# Patient Record
Sex: MALE | Race: WHITE | HISPANIC OR LATINO | URBAN - METROPOLITAN AREA
[De-identification: names, ages, dates, MRNs, and addresses within clinical notes are randomized per-mention and may not be internally consistent; named-entity substitution may affect disease eponyms.]

---

## 2024-03-06 PROBLEM — Z00.00 ROUTINE GENERAL MEDICAL EXAMINATION AT A HEALTH CARE FACILITY: Status: RESOLVED | Noted: 2024-01-16 | Resolved: 2024-03-06

## 2024-03-06 PROBLEM — E66.813 CLASS 3 SEVERE OBESITY DUE TO EXCESS CALORIES WITHOUT SERIOUS COMORBIDITY WITH BODY MASS INDEX (BMI) OF 40.0 TO 44.9 IN ADULT: Status: ACTIVE | Noted: 2024-03-06

## 2024-03-06 PROBLEM — H61.23 BILATERAL IMPACTED CERUMEN: Status: ACTIVE | Noted: 2024-03-06

## 2024-06-13 ENCOUNTER — LAB (OUTPATIENT)
Dept: LAB | Facility: LAB | Age: 24
End: 2024-06-13
Payer: COMMERCIAL

## 2024-06-13 DIAGNOSIS — R79.89 ABNORMAL LIVER FUNCTION TEST: ICD-10-CM

## 2024-06-13 DIAGNOSIS — E03.8 SUBCLINICAL HYPOTHYROIDISM: ICD-10-CM

## 2024-06-13 LAB
ALBUMIN SERPL BCP-MCNC: 5.2 G/DL (ref 3.4–5)
ALP SERPL-CCNC: 89 U/L (ref 33–120)
ALT SERPL W P-5'-P-CCNC: 56 U/L (ref 10–52)
ANION GAP SERPL CALC-SCNC: 16 MMOL/L (ref 10–20)
AST SERPL W P-5'-P-CCNC: 29 U/L (ref 9–39)
BILIRUB SERPL-MCNC: 0.6 MG/DL (ref 0–1.2)
BUN SERPL-MCNC: 15 MG/DL (ref 6–23)
CALCIUM SERPL-MCNC: 10.4 MG/DL (ref 8.6–10.6)
CHLORIDE SERPL-SCNC: 99 MMOL/L (ref 98–107)
CO2 SERPL-SCNC: 27 MMOL/L (ref 21–32)
CREAT SERPL-MCNC: 0.98 MG/DL (ref 0.5–1.3)
EGFRCR SERPLBLD CKD-EPI 2021: >90 ML/MIN/1.73M*2
GLUCOSE SERPL-MCNC: 90 MG/DL (ref 74–99)
POTASSIUM SERPL-SCNC: 4.8 MMOL/L (ref 3.5–5.3)
PROT SERPL-MCNC: 8 G/DL (ref 6.4–8.2)
SODIUM SERPL-SCNC: 137 MMOL/L (ref 136–145)
TSH SERPL-ACNC: 4.37 MIU/L (ref 0.44–3.98)

## 2024-06-13 PROCEDURE — 36415 COLL VENOUS BLD VENIPUNCTURE: CPT

## 2024-06-13 PROCEDURE — 84443 ASSAY THYROID STIM HORMONE: CPT

## 2024-06-13 PROCEDURE — 80053 COMPREHEN METABOLIC PANEL: CPT

## 2024-07-12 PROCEDURE — RXMED WILLOW AMBULATORY MEDICATION CHARGE

## 2024-07-17 ENCOUNTER — PHARMACY VISIT (OUTPATIENT)
Dept: PHARMACY | Facility: CLINIC | Age: 24
End: 2024-07-17
Payer: COMMERCIAL

## 2024-07-22 ENCOUNTER — APPOINTMENT (OUTPATIENT)
Dept: PRIMARY CARE | Facility: CLINIC | Age: 24
End: 2024-07-22
Payer: COMMERCIAL

## 2024-08-02 ENCOUNTER — LAB (OUTPATIENT)
Dept: LAB | Facility: LAB | Age: 24
End: 2024-08-02
Payer: COMMERCIAL

## 2024-08-02 DIAGNOSIS — E03.8 OTHER SPECIFIED HYPOTHYROIDISM: ICD-10-CM

## 2024-08-02 LAB
T3 SERPL-MCNC: 103 NG/DL (ref 60–200)
T4 FREE SERPL-MCNC: 1.25 NG/DL (ref 0.78–1.48)
TSH SERPL-ACNC: 3.44 MIU/L (ref 0.44–3.98)

## 2024-08-13 PROCEDURE — RXMED WILLOW AMBULATORY MEDICATION CHARGE

## 2024-08-19 ENCOUNTER — PHARMACY VISIT (OUTPATIENT)
Dept: PHARMACY | Facility: CLINIC | Age: 24
End: 2024-08-19
Payer: COMMERCIAL

## 2024-08-19 PROCEDURE — RXMED WILLOW AMBULATORY MEDICATION CHARGE

## 2024-09-10 PROCEDURE — RXMED WILLOW AMBULATORY MEDICATION CHARGE

## 2024-09-12 ENCOUNTER — PHARMACY VISIT (OUTPATIENT)
Dept: PHARMACY | Facility: CLINIC | Age: 24
End: 2024-09-12
Payer: COMMERCIAL

## 2024-09-12 ENCOUNTER — APPOINTMENT (OUTPATIENT)
Dept: PRIMARY CARE | Facility: CLINIC | Age: 24
End: 2024-09-12
Payer: COMMERCIAL

## 2024-11-14 PROCEDURE — RXMED WILLOW AMBULATORY MEDICATION CHARGE

## 2024-11-20 ENCOUNTER — PHARMACY VISIT (OUTPATIENT)
Dept: PHARMACY | Facility: CLINIC | Age: 24
End: 2024-11-20
Payer: COMMERCIAL

## 2024-11-25 ENCOUNTER — APPOINTMENT (OUTPATIENT)
Dept: PRIMARY CARE | Facility: CLINIC | Age: 24
End: 2024-11-25
Payer: COMMERCIAL

## 2024-12-12 PROCEDURE — RXMED WILLOW AMBULATORY MEDICATION CHARGE

## 2024-12-18 ENCOUNTER — PHARMACY VISIT (OUTPATIENT)
Dept: PHARMACY | Facility: CLINIC | Age: 24
End: 2024-12-18
Payer: COMMERCIAL

## 2025-01-20 ENCOUNTER — OFFICE VISIT (OUTPATIENT)
Dept: URGENT CARE | Age: 25
End: 2025-01-20
Payer: COMMERCIAL

## 2025-01-20 VITALS
RESPIRATION RATE: 20 BRPM | SYSTOLIC BLOOD PRESSURE: 118 MMHG | TEMPERATURE: 97.8 F | HEIGHT: 72 IN | WEIGHT: 294 LBS | BODY MASS INDEX: 39.82 KG/M2 | OXYGEN SATURATION: 96 % | DIASTOLIC BLOOD PRESSURE: 78 MMHG | HEART RATE: 112 BPM

## 2025-01-20 DIAGNOSIS — R11.0 NAUSEA: ICD-10-CM

## 2025-01-20 DIAGNOSIS — R73.9 HYPERGLYCEMIA: ICD-10-CM

## 2025-01-20 DIAGNOSIS — R55 PRE-SYNCOPE: Primary | ICD-10-CM

## 2025-01-20 LAB — POC FINGERSTICK BLOOD GLUCOSE: 144 MG/DL (ref 70–100)

## 2025-01-20 RX ORDER — ONDANSETRON 4 MG/1
4 TABLET, ORALLY DISINTEGRATING ORAL ONCE
Status: COMPLETED | OUTPATIENT
Start: 2025-01-20 | End: 2025-01-20

## 2025-01-20 RX ADMIN — ONDANSETRON 4 MG: 4 TABLET, ORALLY DISINTEGRATING ORAL at 19:34

## 2025-01-20 RX ADMIN — ONDANSETRON 4 MG: 4 TABLET, ORALLY DISINTEGRATING ORAL at 19:36

## 2025-01-20 ASSESSMENT — ENCOUNTER SYMPTOMS
LIGHT-HEADEDNESS: 1
PSYCHIATRIC NEGATIVE: 1
CONSTITUTIONAL NEGATIVE: 1

## 2025-01-20 NOTE — PATIENT INSTRUCTIONS
Drink plenty of water    If your symptoms got worse or if you developed syncope seek help immediately  Follow up with PCP in a week

## 2025-01-20 NOTE — PROGRESS NOTES
Subjective   Patient ID: Bradley Mcguire is a 24 y.o. male. They present today with a chief complaint of Other.    History of Present Illness  HPI    Past Medical History  Allergies as of 01/20/2025    (No Known Allergies)       (Not in a hospital admission)       History reviewed. No pertinent past medical history.    History reviewed. No pertinent surgical history.     reports that he has never smoked. He has never used smokeless tobacco. He reports current alcohol use. He reports that he does not use drugs.    Review of Systems  Review of Systems   Constitutional: Negative.    HENT:  Positive for congestion.    Cardiovascular:  Positive for chest pain.   Neurological:  Positive for syncope and light-headedness.   Psychiatric/Behavioral: Negative.                                    Objective    Vitals:    01/20/25 1658   BP: 118/78   Pulse: (!) 112   Resp: 20   Temp: 36.6 °C (97.8 °F)   TempSrc: Oral   SpO2: 96%   Weight: 133 kg (294 lb)   Height: 1.829 m (6')     No LMP for male patient.    Physical Exam  Constitutional:       Appearance: Normal appearance.   HENT:      Right Ear: Tympanic membrane normal.      Left Ear: Tympanic membrane normal.      Nose: Nose normal.   Eyes:      Extraocular Movements: Extraocular movements intact.      Conjunctiva/sclera: Conjunctivae normal.      Pupils: Pupils are equal, round, and reactive to light.   Cardiovascular:      Rate and Rhythm: Tachycardia present.   Pulmonary:      Effort: Pulmonary effort is normal.      Breath sounds: Normal breath sounds.   Neurological:      General: No focal deficit present.      Mental Status: He is alert and oriented to person, place, and time.         Procedures    Point of Care Test & Imaging Results from this visit  No results found for this visit on 01/20/25.   No results found.    Diagnostic study results (if any) were reviewed by Caterina Mendes MD.    Assessment/Plan   Allergies, medications,  history, and pertinent labs/EKGs/Imaging reviewed by Caterina Mendes MD.     Medical Decision Making      Orders and Diagnoses  There are no diagnoses linked to this encounter.    Medical Admin Record      Patient disposition: Home    Electronically signed by Caterina Mendes MD  5:57 PM

## 2025-01-24 ENCOUNTER — PHARMACY VISIT (OUTPATIENT)
Dept: PHARMACY | Facility: CLINIC | Age: 25
End: 2025-01-24
Payer: COMMERCIAL

## 2025-01-24 PROCEDURE — RXMED WILLOW AMBULATORY MEDICATION CHARGE

## 2025-03-19 ENCOUNTER — PHARMACY VISIT (OUTPATIENT)
Dept: PHARMACY | Facility: CLINIC | Age: 25
End: 2025-03-19
Payer: COMMERCIAL

## 2025-03-19 PROCEDURE — RXMED WILLOW AMBULATORY MEDICATION CHARGE

## 2025-04-22 ENCOUNTER — OFFICE VISIT (OUTPATIENT)
Facility: HOSPITAL | Age: 25
End: 2025-04-22
Payer: COMMERCIAL

## 2025-04-22 VITALS
OXYGEN SATURATION: 92 % | DIASTOLIC BLOOD PRESSURE: 77 MMHG | HEART RATE: 132 BPM | BODY MASS INDEX: 41.07 KG/M2 | HEIGHT: 72 IN | WEIGHT: 303.2 LBS | SYSTOLIC BLOOD PRESSURE: 138 MMHG | TEMPERATURE: 97.4 F

## 2025-04-22 DIAGNOSIS — E66.813 CLASS 3 SEVERE OBESITY DUE TO EXCESS CALORIES WITHOUT SERIOUS COMORBIDITY WITH BODY MASS INDEX (BMI) OF 40.0 TO 44.9 IN ADULT: ICD-10-CM

## 2025-04-22 DIAGNOSIS — E03.8 SUBCLINICAL HYPOTHYROIDISM: ICD-10-CM

## 2025-04-22 DIAGNOSIS — E66.01 CLASS 3 SEVERE OBESITY DUE TO EXCESS CALORIES WITHOUT SERIOUS COMORBIDITY WITH BODY MASS INDEX (BMI) OF 40.0 TO 44.9 IN ADULT: ICD-10-CM

## 2025-04-22 DIAGNOSIS — F33.1 MODERATE EPISODE OF RECURRENT MAJOR DEPRESSIVE DISORDER: Primary | ICD-10-CM

## 2025-04-22 PROCEDURE — RXMED WILLOW AMBULATORY MEDICATION CHARGE

## 2025-04-22 RX ORDER — BUPROPION HYDROCHLORIDE 150 MG/1
150 TABLET ORAL EVERY MORNING
Qty: 30 TABLET | Refills: 1 | Status: SHIPPED | OUTPATIENT
Start: 2025-04-22 | End: 2025-06-21

## 2025-04-22 ASSESSMENT — ENCOUNTER SYMPTOMS
DEPRESSION: 0
LOSS OF SENSATION IN FEET: 0
OCCASIONAL FEELINGS OF UNSTEADINESS: 0

## 2025-04-22 ASSESSMENT — COLUMBIA-SUICIDE SEVERITY RATING SCALE - C-SSRS
2. HAVE YOU ACTUALLY HAD ANY THOUGHTS OF KILLING YOURSELF?: NO
1. IN THE PAST MONTH, HAVE YOU WISHED YOU WERE DEAD OR WISHED YOU COULD GO TO SLEEP AND NOT WAKE UP?: NO
6. HAVE YOU EVER DONE ANYTHING, STARTED TO DO ANYTHING, OR PREPARED TO DO ANYTHING TO END YOUR LIFE?: NO

## 2025-04-22 ASSESSMENT — PATIENT HEALTH QUESTIONNAIRE - PHQ9
2. FEELING DOWN, DEPRESSED OR HOPELESS: NOT AT ALL
SUM OF ALL RESPONSES TO PHQ9 QUESTIONS 1 AND 2: 0
1. LITTLE INTEREST OR PLEASURE IN DOING THINGS: NOT AT ALL

## 2025-04-22 ASSESSMENT — PAIN SCALES - GENERAL: PAINLEVEL_OUTOF10: 0-NO PAIN

## 2025-04-22 NOTE — PROGRESS NOTES
"Subjective   Patient ID: Bradley Mcguire is a 25 y.o. male who presents for Follow-up.    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     Objective   Vitals: /77 (BP Location: Right arm, Patient Position: Sitting, BP Cuff Size: Large adult)   Pulse (!) 132   Temp 36.3 °C (97.4 °F) (Temporal)   Ht 1.83 m (6' 0.05\")   Wt 138 kg (303 lb 3.2 oz)   SpO2 92%   BMI 41.07 kg/m²      Physical Exam  General:  Well developed. Alert. No acute distress.  Skin:  Warm, dry. normal skin turgor. no rashes. no lesions.   Head: NCAT  EENT: MMM  Cardiovascular:  Regular rate and rhythm, normal S1 and S2, no gallops, no murmurs and no pericardial rub.  Pulmonary:  CTAB in all fields  Abdomen:  (+) BS. soft. non-tender. non-distended. no abdominal masses. no guarding or rigidity.  Neurologic:  grossly intact  Musculoskeletal: moves all extremities spontaneously  Psychiatric:  appropriate mood and affect    Assessment/Plan   26 yo M presenting for f/up.     Moderate episode of recurrent major depressive disorder  - Discussed options for treatment, and landed on bupropion given its activating effects " From: Thomas Bauer  To: Pritesh Mike PA-C  Sent: 7/11/2019 9:49 AM CDT  Subject: Prescription Question    I have an interview tomorrow and I’m out of Xanax. I requested a refill and have not heard back. I have an appointment on 7/17.     Rayo Rooney and potential for weight loss  -     buPROPion XL (Wellbutrin XL) 150 mg 24 hr tablet; Take 1 tablet (150 mg) by mouth once daily in the morning. Do not crush, chew, or split.    Subclinical hypothyroidism  -     TSH; Future  -     T4, free; Future  -     T3, free; Future    Class 3 severe obesity due to excess calories without serious comorbidity with body mass index (BMI) of 40.0 to 44.9 in adult  -     Hemoglobin A1c; Future      RTC in 6 weeks, or earlier as needed.  Attending Supervision: discussed with attending physician Dr. Melanie Acuna.  Saqib Moe MD  Family Medicine Resident.           [1]   Patient Active Problem List  Diagnosis    Other specified hypothyroidism    Binge eating    Pre-diabetes    Class 3 severe obesity due to excess calories without serious comorbidity with body mass index (BMI) of 40.0 to 44.9 in adult    Bilateral impacted cerumen    Dyslipidemia    LUIS ALBERTO (generalized anxiety disorder)    Moderate episode of recurrent major depressive disorder   [2] No past surgical history on file.  [3] No Known Allergies  [4]   Social History  Tobacco Use    Smoking status: Never    Smokeless tobacco: Never   Substance Use Topics    Alcohol use: Yes    Drug use: Never   [5] No family history on file.

## 2025-04-27 DIAGNOSIS — R73.03 PREDIABETES: ICD-10-CM

## 2025-04-27 DIAGNOSIS — E03.8 SUBCLINICAL HYPOTHYROIDISM: ICD-10-CM

## 2025-04-27 DIAGNOSIS — E78.5 DYSLIPIDEMIA: ICD-10-CM

## 2025-04-29 ENCOUNTER — PHARMACY VISIT (OUTPATIENT)
Dept: PHARMACY | Facility: CLINIC | Age: 25
End: 2025-04-29
Payer: COMMERCIAL

## 2025-04-29 PROCEDURE — RXMED WILLOW AMBULATORY MEDICATION CHARGE

## 2025-04-29 RX ORDER — LEVOTHYROXINE SODIUM 75 UG/1
75 TABLET ORAL DAILY
Qty: 30 TABLET | Refills: 11 | Status: SHIPPED | OUTPATIENT
Start: 2025-04-29 | End: 2026-04-29

## 2025-04-29 RX ORDER — METFORMIN HYDROCHLORIDE 500 MG/1
500 TABLET, EXTENDED RELEASE ORAL
Qty: 90 TABLET | Refills: 3 | Status: SHIPPED | OUTPATIENT
Start: 2025-04-29 | End: 2026-04-29

## 2025-04-29 RX ORDER — SIMVASTATIN 20 MG/1
20 TABLET, FILM COATED ORAL DAILY
Qty: 90 TABLET | Refills: 3 | Status: SHIPPED | OUTPATIENT
Start: 2025-04-29 | End: 2026-04-24

## 2025-05-06 ENCOUNTER — PHARMACY VISIT (OUTPATIENT)
Dept: PHARMACY | Facility: CLINIC | Age: 25
End: 2025-05-06
Payer: COMMERCIAL

## 2025-05-22 LAB
EST. AVERAGE GLUCOSE BLD GHB EST-MCNC: 123 MG/DL
EST. AVERAGE GLUCOSE BLD GHB EST-SCNC: 6.8 MMOL/L
HBA1C MFR BLD: 5.9 %
T3FREE SERPL-MCNC: 3.4 PG/ML (ref 2.3–4.2)
T4 FREE SERPL-MCNC: 1.4 NG/DL (ref 0.8–1.8)
TSH SERPL-ACNC: 1.85 MIU/L (ref 0.4–4.5)

## 2025-05-23 ENCOUNTER — APPOINTMENT (OUTPATIENT)
Facility: HOSPITAL | Age: 25
End: 2025-05-23
Payer: COMMERCIAL

## 2025-05-23 VITALS
HEIGHT: 72 IN | HEART RATE: 105 BPM | BODY MASS INDEX: 41.17 KG/M2 | OXYGEN SATURATION: 97 % | TEMPERATURE: 97.1 F | WEIGHT: 304 LBS | SYSTOLIC BLOOD PRESSURE: 128 MMHG | DIASTOLIC BLOOD PRESSURE: 84 MMHG

## 2025-05-23 DIAGNOSIS — R73.03 PREDIABETES: ICD-10-CM

## 2025-05-23 DIAGNOSIS — E03.8 SUBCLINICAL HYPOTHYROIDISM: ICD-10-CM

## 2025-05-23 DIAGNOSIS — F33.1 MODERATE EPISODE OF RECURRENT MAJOR DEPRESSIVE DISORDER: Primary | ICD-10-CM

## 2025-05-23 PROCEDURE — RXMED WILLOW AMBULATORY MEDICATION CHARGE

## 2025-05-23 RX ORDER — METFORMIN HYDROCHLORIDE 500 MG/1
1000 TABLET, EXTENDED RELEASE ORAL
Qty: 180 TABLET | Refills: 3 | Status: SHIPPED | OUTPATIENT
Start: 2025-05-23 | End: 2026-05-23

## 2025-05-23 RX ORDER — METFORMIN HYDROCHLORIDE 1000 MG/1
1000 TABLET, EXTENDED RELEASE ORAL
Qty: 90 TABLET | Refills: 3 | Status: SHIPPED | OUTPATIENT
Start: 2025-05-23 | End: 2025-05-23 | Stop reason: SDUPTHER

## 2025-05-23 RX ORDER — BUPROPION HYDROCHLORIDE 75 MG/1
75 TABLET ORAL 2 TIMES DAILY
Qty: 60 TABLET | Refills: 3 | Status: SHIPPED | OUTPATIENT
Start: 2025-05-23 | End: 2025-07-22

## 2025-05-23 RX ORDER — LEVOTHYROXINE SODIUM 75 UG/1
75 TABLET ORAL DAILY
Qty: 90 TABLET | Refills: 1 | Status: SHIPPED | OUTPATIENT
Start: 2025-05-23 | End: 2026-05-23

## 2025-05-23 ASSESSMENT — PAIN SCALES - GENERAL: PAINLEVEL_OUTOF10: 0-NO PAIN

## 2025-05-23 NOTE — PROGRESS NOTES
Subjective   Patient ID: Bradley Mcguire is a 25 y.o. male who presents for Follow-up and Med Refill.    HPI  - did not tolerate wellbutrin  - had pre-syncopal symptoms while tapering escitalopram   - felt like he was spinning even when laying down, but did not pass out  - did not re-try wellbutrin for fear of continued symptoms  - denied chest pain, palpitations   - symptoms resolved spontaneously, and he continued to taper escitalopram and doing well without either medication currently  - willing to re-start wellbutrin at a lower dose, but not escitalopram due to sexual side effects    Problem List[1]   Current Outpatient Medications   Medication Instructions    buPROPion XL (WELLBUTRIN XL) 150 mg, oral, Every morning, Do not crush, chew, or split.    levothyroxine (SYNTHROID, LEVOXYL) 75 mcg, oral, Daily    metFORMIN XR (GLUCOPHAGE-XR) 500 mg, oral, Daily with evening meal    simvastatin (ZOCOR) 20 mg, oral, Daily      Surgical History[2]   Allergies[3]   Social History[4]   Family History[5]     Review of Systems  Ten point review of systems negative unless specified in HPI.     Objective   Vitals: /84 (BP Location: Right arm, Patient Position: Sitting, BP Cuff Size: Large adult)   Pulse 105   Temp 36.2 °C (97.1 °F) (Temporal)   Ht 1.829 m (6')   Wt 138 kg (304 lb)   SpO2 97%   BMI 41.23 kg/m²      Physical Exam  General:  Well developed. Alert. No acute distress.  Skin:  Warm, dry. normal skin turgor. no rashes. no lesions.   Head: NCAT  EENT: MMM  Cardiovascular:  Regular rate and rhythm, normal S1 and S2, no gallops, no murmurs and no pericardial rub.  Pulmonary:  CTAB in all fields  Abdomen:  (+) BS. soft. non-tender. non-distended. no abdominal masses. no guarding or rigidity.  Neurologic:  grossly intact  Musculoskeletal: moves all extremities spontaneously  Psychiatric:  appropriate mood and affect    Assessment/Plan   24 yo M presenting for follow-up.      Moderate episode of recurrent major depressive disorder  -     buPROPion (Wellbutrin) 75 mg tablet; Take 1 tablet (75 mg) by mouth 2 times a day.   - start at once daily and increase after 3 days as tolerated; discussed increasing to 150 mg BID after 2-4 weeks as tolerated  -     Referral to Psychology; Future    Prediabetes  -     metFORMIN XR (Glucophage-XR) 500 mg 24 hr tablet; Take 2 tablets (1,000 mg) by mouth once daily in the evening. Take with meals.    Subclinical hypothyroidism  -     levothyroxine (Synthroid, Levoxyl) 75 mcg tablet; Take 1 tablet (75 mcg) by mouth once daily.      RTC in 3-6 mos, or earlier as needed.  Attending Supervision: discussed with attending physician Dr. Alena Rahman.  Saqib Moe MD  Family Medicine Resident.           [1]   Patient Active Problem List  Diagnosis    Other specified hypothyroidism    Binge eating    Pre-diabetes    Class 3 severe obesity due to excess calories without serious comorbidity with body mass index (BMI) of 40.0 to 44.9 in adult    Bilateral impacted cerumen    Dyslipidemia    LUIS ALBERTO (generalized anxiety disorder)    Moderate episode of recurrent major depressive disorder   [2] No past surgical history on file.  [3] No Known Allergies  [4]   Social History  Tobacco Use    Smoking status: Never    Smokeless tobacco: Never   Substance Use Topics    Alcohol use: Yes    Drug use: Never   [5] No family history on file.

## 2025-05-27 ENCOUNTER — PHARMACY VISIT (OUTPATIENT)
Dept: PHARMACY | Facility: CLINIC | Age: 25
End: 2025-05-27
Payer: COMMERCIAL

## 2025-06-02 ENCOUNTER — OFFICE VISIT (OUTPATIENT)
Facility: HOSPITAL | Age: 25
End: 2025-06-02
Payer: COMMERCIAL

## 2025-06-02 VITALS
SYSTOLIC BLOOD PRESSURE: 140 MMHG | HEIGHT: 72 IN | DIASTOLIC BLOOD PRESSURE: 88 MMHG | HEART RATE: 72 BPM | TEMPERATURE: 96 F | OXYGEN SATURATION: 96 % | WEIGHT: 297 LBS | BODY MASS INDEX: 40.23 KG/M2

## 2025-06-02 DIAGNOSIS — R73.03 PREDIABETES: ICD-10-CM

## 2025-06-02 DIAGNOSIS — E66.813 CLASS 3 SEVERE OBESITY DUE TO EXCESS CALORIES WITHOUT SERIOUS COMORBIDITY WITH BODY MASS INDEX (BMI) OF 40.0 TO 44.9 IN ADULT: Primary | ICD-10-CM

## 2025-06-02 DIAGNOSIS — G47.33 OSA (OBSTRUCTIVE SLEEP APNEA): ICD-10-CM

## 2025-06-02 PROCEDURE — 1036F TOBACCO NON-USER: CPT | Performed by: PODIATRIST

## 2025-06-02 PROCEDURE — 3008F BODY MASS INDEX DOCD: CPT | Performed by: PODIATRIST

## 2025-06-02 PROCEDURE — 99213 OFFICE O/P EST LOW 20 MIN: CPT | Performed by: PODIATRIST

## 2025-06-02 PROCEDURE — 99213 OFFICE O/P EST LOW 20 MIN: CPT | Mod: GE | Performed by: PODIATRIST

## 2025-06-02 RX ORDER — TIRZEPATIDE 2.5 MG/.5ML
2.5 INJECTION, SOLUTION SUBCUTANEOUS
Qty: 2 ML | Refills: 0 | Status: SHIPPED | OUTPATIENT
Start: 2025-06-02 | End: 2025-07-02

## 2025-06-02 RX ORDER — TIRZEPATIDE 5 MG/.5ML
5 INJECTION, SOLUTION SUBCUTANEOUS
Qty: 2 ML | Refills: 2 | Status: SHIPPED | OUTPATIENT
Start: 2025-07-02 | End: 2025-09-30

## 2025-06-02 ASSESSMENT — ENCOUNTER SYMPTOMS
AGITATION: 0
ADENOPATHY: 0
SINUS PRESSURE: 0
NUMBNESS: 0
ABDOMINAL DISTENTION: 0
VOICE CHANGE: 0
FLANK PAIN: 0
EYE ITCHING: 0
HEADACHES: 0
BACK PAIN: 0
ABDOMINAL PAIN: 0
ARTHRALGIAS: 0
SORE THROAT: 0
DIZZINESS: 0
DIFFICULTY URINATING: 0
EYE REDNESS: 0
CHEST TIGHTNESS: 0
DYSURIA: 0
FEVER: 0
SHORTNESS OF BREATH: 0
CHILLS: 0
FATIGUE: 0
RHINORRHEA: 0
DIAPHORESIS: 0
COUGH: 0
HEMATURIA: 0

## 2025-06-02 ASSESSMENT — PAIN SCALES - GENERAL: PAINLEVEL_OUTOF10: 0-NO PAIN

## 2025-06-02 NOTE — PATIENT INSTRUCTIONS
Thank you for coming in to see us today, Mr. Bradley Whitten Kalee Mcguire! It was a pleasure managing your health together.    Today in clinic, we discussed weight loss, prediabetes, snoring.    If we ordered bloodwork today, you can get this done at ANY  location and the results will come to me directly. The [facility] here at [facility] are walk-in (no appointment needed); [facility]'s hours are M-F 6:30a-6p and Sat 8a-12p.    Please call [phone] to schedule your appointment.    If you have any questions/concerns, you can always use [employer] to message me directly. Or if you prefer, you can call the office at [phone]; if you leave a message, the office staff should translate this to a message in my inbox.    I'm looking forward to seeing you back in clinic in 3 months to discuss follow up.    Best,  Dr. Mcintyre

## 2025-06-02 NOTE — PROGRESS NOTES
Subjective   Patient ID: Bradley Mgcuire is a 25 y.o. male with PMH subclinical hypothyroidism, prediabetes, Anxiety who presents for Follow-up (Weight loss and other issues. ) and presents with his wife.     HPI     #Weight loss   He has been looking at options to lose weight. He would like referral to [facility]. He would like to be on a GLP-1. He takes 1000 mg metformin before night and tolerates it well with no side effects. He is more interested in starting GLP-1 instead of increasing metformin. Last A1c 5.9% 5/21/25. Uses a kettle bell, plays some pickle ball for exercise. Does not enjoy going to the gym. He walks the dog with his wife.     #Snoring  He stops breathing at night according to his wife. It has been going on for 4 months. STOP-BANG 4.    #Social  -Is a radiology resident here at . Presents with wife who is also in medical field wanting to pursue psychiatry.     #Anxiety  Has not seen psychology yet. Currently on bupropion.       Review of Systems   Constitutional:  Negative for chills, diaphoresis, fatigue and fever.   HENT:  Negative for congestion, rhinorrhea, sinus pressure, sore throat and voice change.    Eyes:  Negative for redness and itching.   Respiratory:  Negative for cough, chest tightness and shortness of breath.    Cardiovascular:  Negative for chest pain.   Gastrointestinal:  Negative for abdominal distention and abdominal pain.   Endocrine: Negative for cold intolerance and heat intolerance.   Genitourinary:  Negative for difficulty urinating, dysuria, flank pain and hematuria.   Musculoskeletal:  Negative for arthralgias and back pain.   Neurological:  Negative for dizziness, numbness and headaches.   Hematological:  Negative for adenopathy.   Psychiatric/Behavioral:  Negative for agitation and behavioral problems.        Objective   /88 (BP Location: Right arm, Patient Position: Sitting)   Pulse 72   Temp 35.6 °C (96 °F) (Temporal)   Ht  1.829 m (6')   Wt 135 kg (297 lb)   SpO2 96%   BMI 40.28 kg/m²     Physical Exam  Vitals reviewed.   Constitutional:       General: He is not in acute distress.     Appearance: Normal appearance.   HENT:      Head: Normocephalic and atraumatic.      Nose: Nose normal.      Mouth/Throat:      Mouth: Mucous membranes are moist.   Eyes:      Conjunctiva/sclera: Conjunctivae normal.   Cardiovascular:      Rate and Rhythm: Normal rate and regular rhythm.   Pulmonary:      Effort: Pulmonary effort is normal. No respiratory distress.      Breath sounds: Normal breath sounds. No wheezing.   Abdominal:      General: Bowel sounds are normal.      Palpations: Abdomen is soft.      Tenderness: There is no abdominal tenderness.   Musculoskeletal:         General: No swelling. Normal range of motion.      Cervical back: Normal range of motion and neck supple.   Skin:     General: Skin is warm.   Neurological:      General: No focal deficit present.      Mental Status: He is alert.         Assessment/Plan     Bradley Whitten Kalee Mcguire is a 25 y.o. male with PMH subclinical hypothyroidism, prediabetes, Anxiety who presents for Follow-up (Weight loss and other issues. ).    Diagnoses and all orders for this visit:  Class 3 severe obesity due to excess calories without serious comorbidity with body mass index (BMI) of 40.0 to 44.9 in adult  Prediabetes        -     Last A1c 5.9% on 5/21/25. In prediabetes range. Is currently on metformin 1000 mg at night. Tolerating it well with no side effects. Declined increasing dose of metformin. Would like to start on GLP1 agonist. Discussed side effects. Will start on Zepbound 2.5 mg weekly. Discussed potential side effects with patient. Discussed importance of exercise while on this medication. Referral to [facility] and clinical pharmacy to cover potential prior auth for Zepbound.   -     Referral to [facility]; Future  -     Referral to Clinical Pharmacy; Future  -      tirzepatide, weight loss, (Zepbound) 2.5 mg/0.5 mL injection; Inject 2.5 mg under the skin every 7 days.  -     tirzepatide, weight loss, (Zepbound) 5 mg/0.5 mL injection; Inject 5 mg under the skin every 7 days. Do not fill before July 2, 2025.    ROSSY (obstructive sleep apnea)  -     Patient has a STOP-BANG of 4. Will refer to sleep medicine for further work up.   -     Referral to Adult Sleep Medicine; Future  Return to clinic        -     In 3 months  for follow up of Zepbound and how tolerating, Anxiety and ROSSY    Patient discussed with attending physician, Dr. Magdaleno.     Jyothi Mcintyre MD   PGY2, Family Medicine

## 2025-06-02 NOTE — PROGRESS NOTES
I reviewed the resident/fellow's documentation and discussed the patient with the resident/fellow. I agree with the resident/fellow's medical decision making as documented in the note.    Brady Magdaleno MD

## 2025-06-19 ENCOUNTER — APPOINTMENT (OUTPATIENT)
Facility: HOSPITAL | Age: 25
End: 2025-06-19
Payer: COMMERCIAL

## 2025-06-25 ENCOUNTER — CLINICAL SUPPORT (OUTPATIENT)
Dept: EMERGENCY MEDICINE | Facility: HOSPITAL | Age: 25
End: 2025-06-25
Payer: COMMERCIAL

## 2025-06-25 ENCOUNTER — HOSPITAL ENCOUNTER (OUTPATIENT)
Facility: HOSPITAL | Age: 25
Setting detail: OBSERVATION
Discharge: PSYCHIATRIC OR SUBSTANCE USE FACILITY | End: 2025-06-27
Attending: STUDENT IN AN ORGANIZED HEALTH CARE EDUCATION/TRAINING PROGRAM | Admitting: EMERGENCY MEDICINE
Payer: COMMERCIAL

## 2025-06-25 DIAGNOSIS — R45.851 SUICIDAL IDEATION: Primary | ICD-10-CM

## 2025-06-25 LAB
ALBUMIN SERPL BCP-MCNC: 5.1 G/DL (ref 3.4–5)
ALP SERPL-CCNC: 83 U/L (ref 33–120)
ALT SERPL W P-5'-P-CCNC: 53 U/L (ref 10–52)
AMPHETAMINES UR QL SCN: NORMAL
ANION GAP SERPL CALC-SCNC: 15 MMOL/L (ref 10–20)
APAP SERPL-MCNC: <10 UG/ML (ref ?–30)
APPEARANCE UR: CLEAR
AST SERPL W P-5'-P-CCNC: 25 U/L (ref 9–39)
BARBITURATES UR QL SCN: NORMAL
BASOPHILS # BLD AUTO: 0.04 X10*3/UL (ref 0–0.1)
BASOPHILS NFR BLD AUTO: 0.5 %
BENZODIAZ UR QL SCN: NORMAL
BILIRUB SERPL-MCNC: 0.5 MG/DL (ref 0–1.2)
BILIRUB UR STRIP.AUTO-MCNC: NEGATIVE MG/DL
BUN SERPL-MCNC: 13 MG/DL (ref 6–23)
BZE UR QL SCN: NORMAL
CALCIUM SERPL-MCNC: 10.1 MG/DL (ref 8.6–10.6)
CANNABINOIDS UR QL SCN: NORMAL
CHLORIDE SERPL-SCNC: 103 MMOL/L (ref 98–107)
CO2 SERPL-SCNC: 22 MMOL/L (ref 21–32)
COLOR UR: ABNORMAL
CREAT SERPL-MCNC: 0.9 MG/DL (ref 0.5–1.3)
EGFRCR SERPLBLD CKD-EPI 2021: >90 ML/MIN/1.73M*2
EOSINOPHIL # BLD AUTO: 0.11 X10*3/UL (ref 0–0.7)
EOSINOPHIL NFR BLD AUTO: 1.3 %
ERYTHROCYTE [DISTWIDTH] IN BLOOD BY AUTOMATED COUNT: 11.9 % (ref 11.5–14.5)
ETHANOL SERPL-MCNC: <10 MG/DL
FENTANYL+NORFENTANYL UR QL SCN: NORMAL
GLUCOSE SERPL-MCNC: 107 MG/DL (ref 74–99)
GLUCOSE UR STRIP.AUTO-MCNC: NORMAL MG/DL
HCT VFR BLD AUTO: 44.9 % (ref 41–52)
HGB BLD-MCNC: 15.7 G/DL (ref 13.5–17.5)
IMM GRANULOCYTES # BLD AUTO: 0.02 X10*3/UL (ref 0–0.7)
IMM GRANULOCYTES NFR BLD AUTO: 0.2 % (ref 0–0.9)
KETONES UR STRIP.AUTO-MCNC: NEGATIVE MG/DL
LEUKOCYTE ESTERASE UR QL STRIP.AUTO: NEGATIVE
LYMPHOCYTES # BLD AUTO: 1.74 X10*3/UL (ref 1.2–4.8)
LYMPHOCYTES NFR BLD AUTO: 20.6 %
MCH RBC QN AUTO: 28.6 PG (ref 26–34)
MCHC RBC AUTO-ENTMCNC: 35 G/DL (ref 32–36)
MCV RBC AUTO: 82 FL (ref 80–100)
METHADONE UR QL SCN: NORMAL
MONOCYTES # BLD AUTO: 0.66 X10*3/UL (ref 0.1–1)
MONOCYTES NFR BLD AUTO: 7.8 %
MUCOUS THREADS #/AREA URNS AUTO: NORMAL /LPF
NEUTROPHILS # BLD AUTO: 5.86 X10*3/UL (ref 1.2–7.7)
NEUTROPHILS NFR BLD AUTO: 69.6 %
NITRITE UR QL STRIP.AUTO: NEGATIVE
NRBC BLD-RTO: 0 /100 WBCS (ref 0–0)
OPIATES UR QL SCN: NORMAL
OXYCODONE+OXYMORPHONE UR QL SCN: NORMAL
PCP UR QL SCN: NORMAL
PH UR STRIP.AUTO: 6 [PH]
PLATELET # BLD AUTO: 337 X10*3/UL (ref 150–450)
POTASSIUM SERPL-SCNC: 4 MMOL/L (ref 3.5–5.3)
PROT SERPL-MCNC: 8.5 G/DL (ref 6.4–8.2)
PROT UR STRIP.AUTO-MCNC: NEGATIVE MG/DL
RBC # BLD AUTO: 5.49 X10*6/UL (ref 4.5–5.9)
RBC # UR STRIP.AUTO: ABNORMAL MG/DL
RBC #/AREA URNS AUTO: NORMAL /HPF
SALICYLATES SERPL-MCNC: <3 MG/DL (ref ?–20)
SODIUM SERPL-SCNC: 136 MMOL/L (ref 136–145)
SP GR UR STRIP.AUTO: 1.03
SQUAMOUS #/AREA URNS AUTO: NORMAL /HPF
TSH SERPL-ACNC: 3.95 MIU/L (ref 0.44–3.98)
UROBILINOGEN UR STRIP.AUTO-MCNC: NORMAL MG/DL
WBC # BLD AUTO: 8.4 X10*3/UL (ref 4.4–11.3)
WBC #/AREA URNS AUTO: NORMAL /HPF

## 2025-06-25 PROCEDURE — 80307 DRUG TEST PRSMV CHEM ANLYZR: CPT | Performed by: STUDENT IN AN ORGANIZED HEALTH CARE EDUCATION/TRAINING PROGRAM

## 2025-06-25 PROCEDURE — 99285 EMERGENCY DEPT VISIT HI MDM: CPT | Performed by: STUDENT IN AN ORGANIZED HEALTH CARE EDUCATION/TRAINING PROGRAM

## 2025-06-25 PROCEDURE — 36415 COLL VENOUS BLD VENIPUNCTURE: CPT | Performed by: STUDENT IN AN ORGANIZED HEALTH CARE EDUCATION/TRAINING PROGRAM

## 2025-06-25 PROCEDURE — 93010 ELECTROCARDIOGRAM REPORT: CPT | Performed by: STUDENT IN AN ORGANIZED HEALTH CARE EDUCATION/TRAINING PROGRAM

## 2025-06-25 PROCEDURE — 93005 ELECTROCARDIOGRAM TRACING: CPT

## 2025-06-25 PROCEDURE — 81001 URINALYSIS AUTO W/SCOPE: CPT | Mod: 59 | Performed by: STUDENT IN AN ORGANIZED HEALTH CARE EDUCATION/TRAINING PROGRAM

## 2025-06-25 PROCEDURE — 80053 COMPREHEN METABOLIC PANEL: CPT | Performed by: STUDENT IN AN ORGANIZED HEALTH CARE EDUCATION/TRAINING PROGRAM

## 2025-06-25 PROCEDURE — 84443 ASSAY THYROID STIM HORMONE: CPT | Performed by: STUDENT IN AN ORGANIZED HEALTH CARE EDUCATION/TRAINING PROGRAM

## 2025-06-25 PROCEDURE — 80143 DRUG ASSAY ACETAMINOPHEN: CPT | Performed by: STUDENT IN AN ORGANIZED HEALTH CARE EDUCATION/TRAINING PROGRAM

## 2025-06-25 PROCEDURE — 85025 COMPLETE CBC W/AUTO DIFF WBC: CPT | Performed by: STUDENT IN AN ORGANIZED HEALTH CARE EDUCATION/TRAINING PROGRAM

## 2025-06-25 SDOH — HEALTH STABILITY: MENTAL HEALTH: BEHAVIORAL HEALTH(WDL): EXCEPTIONS TO WDL

## 2025-06-25 SDOH — SOCIAL STABILITY: SOCIAL INSECURITY: FAMILY BEHAVIORS: APPROPRIATE FOR SITUATION;ANXIOUS

## 2025-06-25 SDOH — SOCIAL STABILITY: SOCIAL NETWORK: PARENT/GUARDIAN/SIGNIFICANT OTHER INVOLVEMENT: ATTENTIVE TO PATIENT NEEDS

## 2025-06-25 SDOH — HEALTH STABILITY: MENTAL HEALTH: BEHAVIORS/MOOD: ANXIOUS;CALM;COOPERATIVE

## 2025-06-25 ASSESSMENT — PAIN SCALES - GENERAL: PAINLEVEL_OUTOF10: 0 - NO PAIN

## 2025-06-25 ASSESSMENT — PAIN - FUNCTIONAL ASSESSMENT: PAIN_FUNCTIONAL_ASSESSMENT: 0-10

## 2025-06-25 NOTE — ED TRIAGE NOTES
Pt presents to ED for concern for anxiety and depression. 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 Patient states he has been feeling overwhelmed and stressed lately. Pt endorses SI with no plan. Denies AH/VH. Denies previous self harm. Denies any physical concerns. PMHx Prediabetes and anxiety.

## 2025-06-25 NOTE — ED PROVIDER NOTES
History of Present Illness     History provided by: Patient  Limitations to History: None  External Records Reviewed with Brief Summary: None    HPI:  Bradley Carmona Fish Mcguire is a 25 y.o. male with past medical history significant for anxiety who is presenting for suicidal ideation.  He is about to start a new job where he works 80 hours a week and it is not the job that he wanted and he is very anxious.  He has been having intrusive thoughts of wanting to hurt himself.  He has no plan.  No HI, delusions, hallucinations.  Just started on bupropion 4 days ago by his PCP.  No prior psychiatric admissions.  No firearms or weapons at home.  Lives at home with his wife.    Physical Exam   Triage vitals:  T 36.3 °C (97.3 °F)  HR 78  /86  RR 18  O2 95 % None (Room air)    GEN: Well-appearing male, in no distress.   HEENT: Normocephalic and atraumatic. Moist mucous membranes.  NECK: Normal ROM.   CARDIAC: Regular rate and rhythm.  RESP: No increased work of breathing.  EXTREMITIES: Normal ROM.   SKIN: Warm, dry.   NEURO: Alert and oriented x4. Moving all extremities symmetrically and spontaneously.   PSYCH: Calm, cooperative. Not responding to internal stimuli.     Medical Decision Making & ED Course   Medical Decision Makin y.o. male with past medical history significant for anxiety who is presenting for suicidal ideation.  On arrival, vitals are unremarkable.  Exam shows a well-appearing male who is calm and cooperative, and not responding to internal stimuli.    Patient is here with suicidal ideation, however has no plan or intent.  He has no firearms at home, and has a good support system.  I suspect the patient will be safe for discharge home, however will consult with EPAT and appreciate their recommendations and resources.  Will obtain medical clearance labs.  Disposition pending follow-up and discussion with EPAT.  ----     Social Determinants of Health which Significantly  Impact Care: None identified     EKG Independent Interpretation: EKG interpreted by myself. Please see ED Course for full interpretation.    Independent Result Review and Interpretation: Relevant laboratory and radiographic results were reviewed and independently interpreted by myself.  As necessary, they are commented on in the ED Course.    The patient was discussed with the following consultants/services: None    ED Course:  ED Course as of 06/28/25 1757   Wed Jun 25, 2025   1714 ECG 12 lead  EKG shows normal sinus rhythm, rate 86, , QRS 76, QTc 428, normal axis deviation, nonactionable, flattening in aVL, no previous EKGs available for comparison [JH]   2324 EPAT wants to keep overnight for psych attending to see in AM. Pt is agreeable [SS]   Thu Jun 26, 2025   1558 Patient is cleared for psychiatric placement [IB]      ED Course User Index  [IB] Ortiz Miranda MD  [JH] Jerry Fonseca MD  [SS] Luiza Donato MD         Diagnoses as of 06/28/25 1757   Suicidal ideation     Disposition   Signed out pending further EPAT evaluation    Procedures   Procedures    Luiza Donato MD  Emergency Medicine       Luiza Donato MD  06/28/25 1758

## 2025-06-26 PROBLEM — R45.851 SUICIDAL IDEATION: Status: ACTIVE | Noted: 2025-06-26

## 2025-06-26 LAB
ATRIAL RATE: 86 BPM
HOLD SPECIMEN: NORMAL
P AXIS: 57 DEGREES
P OFFSET: 170 MS
P ONSET: 122 MS
PR INTERVAL: 182 MS
Q ONSET: 213 MS
QRS COUNT: 14 BEATS
QRS DURATION: 76 MS
QT INTERVAL: 358 MS
QTC CALCULATION(BAZETT): 428 MS
QTC FREDERICIA: 403 MS
R AXIS: 87 DEGREES
T AXIS: 59 DEGREES
T OFFSET: 392 MS
VENTRICULAR RATE: 86 BPM

## 2025-06-26 PROCEDURE — G0378 HOSPITAL OBSERVATION PER HR: HCPCS

## 2025-06-26 PROCEDURE — 99244 OFF/OP CNSLTJ NEW/EST MOD 40: CPT | Performed by: PSYCHIATRY & NEUROLOGY

## 2025-06-26 PROCEDURE — 2500000001 HC RX 250 WO HCPCS SELF ADMINISTERED DRUGS (ALT 637 FOR MEDICARE OP)

## 2025-06-26 PROCEDURE — 2500000002 HC RX 250 W HCPCS SELF ADMINISTERED DRUGS (ALT 637 FOR MEDICARE OP, ALT 636 FOR OP/ED)

## 2025-06-26 RX ORDER — METFORMIN HYDROCHLORIDE 500 MG/1
500 TABLET, EXTENDED RELEASE ORAL ONCE
Status: COMPLETED | OUTPATIENT
Start: 2025-06-26 | End: 2025-06-26

## 2025-06-26 RX ORDER — BUPROPION HYDROCHLORIDE 75 MG/1
75 TABLET ORAL ONCE
Status: COMPLETED | OUTPATIENT
Start: 2025-06-26 | End: 2025-06-26

## 2025-06-26 RX ORDER — ESCITALOPRAM OXALATE 10 MG/1
10 TABLET ORAL DAILY
Status: DISCONTINUED | OUTPATIENT
Start: 2025-06-26 | End: 2025-06-27 | Stop reason: HOSPADM

## 2025-06-26 RX ORDER — SIMVASTATIN 20 MG/1
20 TABLET, FILM COATED ORAL ONCE
Status: COMPLETED | OUTPATIENT
Start: 2025-06-26 | End: 2025-06-26

## 2025-06-26 RX ORDER — LEVOTHYROXINE SODIUM 75 UG/1
75 TABLET ORAL ONCE
Status: COMPLETED | OUTPATIENT
Start: 2025-06-26 | End: 2025-06-26

## 2025-06-26 RX ADMIN — ESCITALOPRAM OXALATE 10 MG: 10 TABLET ORAL at 13:18

## 2025-06-26 RX ADMIN — LEVOTHYROXINE SODIUM 75 MCG: 0.07 TABLET ORAL at 13:18

## 2025-06-26 RX ADMIN — SIMVASTATIN 20 MG: 20 TABLET, FILM COATED ORAL at 13:18

## 2025-06-26 RX ADMIN — BUPROPION HYDROCHLORIDE 75 MG: 75 TABLET, FILM COATED ORAL at 13:18

## 2025-06-26 RX ADMIN — METFORMIN ER 500 MG 500 MG: 500 TABLET ORAL at 13:18

## 2025-06-26 NOTE — H&P
Observation History and Physical  [facility] EMERGENCY MEDICINE           History of Present Illness     History provided by: Patient  Limitations to History: None  External Records Reviewed: None      Patient History:  Bradley Mcguire is a 25 y.o. male who presented to the emergency department for suicidal ideation.  The patient does endorse SI with a plan and has overall hopelessness but denies any auditory or visual hallucinations.    Bradley Mcguire was escalated to observation status. Observation was necessary as they continue to require treatment and monitoring of their psychiatric illness while awaiting inpatient behavioral health bed availability.    Physical Exam     Visit Vitals  /79   Pulse 90   Temp 36 °C (96.8 °F) (Temporal)   Resp 18   SpO2 100%   Smoking Status Never       GENERAL:  The patient appears nourished and normally developed. Vital signs as documented.     PULMONARY:  Without any respiratory distress. Able to speak full sentences, no accessory muscle use    CARDIAC: Warm and well perfused. No cyanosis.    MUSCULOSKELETAL:   Able to ambulate, Non edematous, with no obvious deformities.     SKIN: No pallor. Intact.    NEURO:  No obvious neurological deficits.  Able to follow commands.    Psych: SI with a plan.  Denies homicidal ideation or auditory/visual hallucinations.      Impression and Plan     ED Course as of 06/29/25 1341   Wed Jun 25, 2025   1714 ECG 12 lead  EKG shows normal sinus rhythm, rate 86, , QRS 76, QTc 428, normal axis deviation, nonactionable, flattening in aVL, no previous EKGs available for comparison [JH]   2324 EPAT wants to keep overnight for psych attending to see in AM. Pt is agreeable [SS]   Thu Jun 26, 2025   1558 Patient is cleared for psychiatric placement [IB]      ED Course User Index  [IB] Ortiz Miranda MD  [JH] Jerry Fonseca MD  [SS] Luiza Donato MD          Diagnoses as of 06/29/25 1341   Suicidal ideation        Bradleysakina Peterpenny Carmona Fish Mcguire under observation status in [facility] EMERGENCY MEDICINE for psychiatric illness monitoring and treatment while awaiting inpatient behavioral health bed availability.   Emergency Psychiatric Assessment Team has been consulted. Case discussed with them and decision for inpatient hospitalization deemed necessary. Patient is medically clear at this time.     Home medication reconciliation was reviewed and restarted where clinically indicated.     Patient and Family updated on plan of care.     Thomas Philippe, DO    ** Please excuse any errors in grammar or translation related to this dictation. Voice recognition software was utilized to prepare this document. **       Todd Baker MD  [facility] Emergency Medicine

## 2025-06-26 NOTE — SIGNIFICANT EVENT
Application for Emergency Admission      Ready for Transfer?  Is the patient medically cleared for transfer to inpatient psychiatry: Yes  Has the patient been accepted to an inpatient psychiatric hospital: Yes    Application for Emergency Admission  IN ACCORDANCE WITH [place] 5122.10 [place]  The Chief Clinical Officer of: Dr. Trudy Cedeño 6/26/2025 .3:48 PM    Reason for Hospitalization  The undersigned has reason to believe that: Bradley Mcguire Is a mentally ill person subject to hospitalization by court order under division B Section 5122.01 of the Revised Code, i.e., this person:    1.Yes  Represents a substantial risk of physical harm to self as manifested by evidence of threats of, or attempts at, suicide or serious self-inflicted bodily harm    2.No Represents a substantial risk of physical harm to others as manifested by evidence of recent homicidal or other violent behavior, evidence of recent threats that place another in reasonable fear of violent behavior and serious physical harm, or other evidence of present dangerousness    3.No Represents a substantial and immediate risk of serious physical impairment or injury to self as manifested by  evidence that the person is unable to provide for and is not providing for the person's basic physical needs because of the person's mental illness and that appropriate provision for those needs cannot be made  immediately available in the community    4.Yes Would benefit from treatment in a hospital for his mental illness and is in need of such treatment as manifested by evidence of behavior that creates a grave and imminent risk to substantial rights of others or  himself.    5.Yes Would benefit from treatment as manifested by evidence of behavior that indicates all of the following:       (a) The person is unlikely to survive safely in the community without supervision, based on a clinical determination.       (b) The person  has a history of lack of compliance with treatment for mental illness and one of the following applies:      (i) At least twice within the thirty-six months prior to the filing of an affidavit seeking court-ordered treatment of the person under section 5122.111 of the Revised Code, the lack of compliance has been a significant factor in necessitating hospitalization in a hospital or receipt of services in a forensic or other mental health unit of a correctional facility, provided that the thirty-six-month period shall be extended by the length of any hospitalization or incarceration of the person that occurred within the thirty-six-month period.      (ii) Within the forty-eight months prior to the filing of an affidavit seeking court-ordered treatment of the person under section 5122.111 of the Revised Code, the lack of compliance resulted in one or more acts of serious violent behavior toward self or others or threats of, or attempts at, serious physical harm to self or others, provided that the forty-eight-month period shall be extended by the length of any hospitalization or incarceration of the person that occurred within the forty-eight-month period.      (c) The person, as a result of mental illness, is unlikely to voluntarily participate in necessary treatment.       (d) In view of the person's treatment history and current behavior, the person is in need of treatment in order to prevent a relapse or deterioration that would be likely to result in substantial risk of serious harm to the person or others.    (e) Represents a substantial risk of physical harm to self or others if allowed to remain at liberty pending examination.    Therefore, it is requested that said person be admitted to the above named facility.    STATEMENT OF BELIEF    Must be filled out by one of the following: a psychiatrist, licensed physician, licensed clinical psychologist, health or ,  or deputy  .  (Statement shall include the circumstances under which the individual was taken into custody and the reason for the person's belief that hospitalization is necessary. The statement shall also include a reference to efforts made to secure the individual's property at his residence if he was taken into custody there. Every reasonable and appropriate effort should be made to take this person into custody in the least conspicuous manner possible.)    25YOM hx of depression anxiety, presented for SI with feelings of hopelessness in setting of transitioning to surgical residency- feels like he can't managed symptoms at home, would benefit from inpatient psychiatric evaluation and treatment.    Ortiz Miranda MD 6/26/2025     _____________________________________________________________   Place of Employment: [facility]    STATEMENT OF OBSERVATION BY PSYCHIATRIST, LICENSED PHYSICIAN, OR LICENSED CLINICAL PSYCHOLOGIST, IF APPLICABLE    Place of Observation (e.g., [facility] mental health center, general hospital, office, emergency facility)  (If applicable, please complete)    Ortiz Miranda MD 6/26/2025    _____________________________________________________________

## 2025-06-26 NOTE — PROGRESS NOTES
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  He had approached his program head to disclose anxiety and fear he was experiencing related to starting this program on Monday.  He admitted that he was so upset and anxious that he started having suicidal thoughts.  Provider Note and chart history is reviewed.  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  They both are here in the [place] for the last 2.5 years.  He is a Radiology Resident. The patient chose  to continue his studies reporting the VISA arrangements were the best. He must complete 1 year of Internship and was matched to Surgery although this his least favorite specialty.  He had told himself it is “only a year” but has the time draws closer to program start he is increasingly distressed including crying spells, excessive/uncontrolled worry and ruminations (catastrophizing). His stress is exacerbated by colleagues noting he will be miserable and the program reporting a 20% failure rate.  He finds himself only able to think negatively about what this experience will bring.    He had some history of improvement with Lexapro but suffered some sexual side effects and eventually went off the medication.  He was started recently on Wellbutrin 150 mg which was lowered to 75 mg after near syncope event.  He denies prior history of anxiety and depression.  He denies any specific plan to harm himself.  He is plagued by fear he will fail and lose his VISA reporting little opportunities for him and his wife if [place].  He is anxious with  feelings of helplessness but cooperative and candid in assessment.  He had attempted to secure psychiatric appointment here but was told it would be longer than 90 days.      Readmission within 30 Days: No    Psychiatric Symptoms  Anxiety Symptoms: Generalized  Depression Symptoms: depressed mood, crying spells, difficulty concentration, some loss of appetite, feelings of helplessness, wishing he was dead.   Maye Symptoms: none reported or observed      Psychosis Symptoms  Hallucination Type: none   Delusion Type: none     Additional Symptoms - Adult  Generalized Anxiety Disorder: some excessive worry, difficulty controlling worry (negative ruminations)  Obsessive Compulsive Disorder: No problems reported or observed   Panic Attack: waves of anxiety with crying but denies panic events   Post-Traumatic Stress Disorder: No problems reported or observed    Delirium: No problems reported or observed     Past Psychiatric History/Meds/Treatments  Past Psychiatric History: none prior to onset of worry and depressed mood in context of Internship pending in surgery here.   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  Primary language is Vietnamese.  Raised by mother.  Patient has 12  year old brother.  Father abandoned the family.  , no children.  In [place] last 2.5 years.  Radiology resident.  Living now in apartment in [place].      Cultural Requests During Hospitalization: none  Spiritual Requests during Hospitalization: denies   Important Activities: family  Legal  Criminal Activity/ Legal Involvement Pertinent to Current Situation/ Hospitalization: denies      Drug Screening  Have you used any substances (cannabis, cocaine, heroin, hallucinogens, inhalants, etc.) in the past 12 months?: No  Have you used any prescription drugs other than prescribed in the past 12 months?: No  Is a toxicology screen needed?: Yes  Orientation: Fully oriented      General Appearance  Motor Activity: currently unremarkable   Speech Pattern: Unremarkable   General Attitude: Cooperative, pleasant, forthcoming   Appearance/Hygiene: hygiene and grooming intact    Thought Process  Coherency: Organized   Content: negative thoughts about surgery internship  Delusions: none   Perception: Not altered   Hallucination: None   Judgment/Insight: Help seeking and future oriented, Intact   Confusion: none   Cognition: Intact   Sleep Pattern: sleeps all night 6-8 hours    Risk Factors  Self- Harm/Suicidal Ideation Plan: SI with no plan   Previous Self Harm/Suicidal Plans: none   Risk Factors: Male, triggering events     Violence Risk Assessment  Assessment of Violence: None   Thoughts of Harm to Others: No    C-SSRS  Ability to Assess Risk Screen  Risk Screen - Ability to Assess: yes  Ask Suicide-Screening Questions  1. In the past few weeks, have you wished you were dead?: yes  2. In the past few weeks, have you felt that you or your family would be better off if you were dead?: yes  3. In the past week, have you been having thoughts about killing yourself? yes  4. Have you ever tried to kill yourself? No   5. Are you having thoughts of killing yourself right now?: No  Calculated Risk Score:  Potential risk  [place] Suicide Severity Rating Scale (Screener/Recent Self-Report)  1. Wish to be Dead (Past 1 Month): yes   2. Non-Specific Active Suicidal Thoughts (Past 1 Month): yes  3. Active Suicidal Ideation with any Methods (Not Plan) Without Intent to Act (Past 1 Month): No  4. Active Suicidal Ideation with Some Intent to Act, Without Specific Plan (Past 1 Month): No  5. Active Suicidal Ideation with Specific Plan and Intent (Past 1 Month): No  6. Suicidal Behavior (Lifetime): No  6. Suicidal Behavior (3 Months): No  Calculated C-SSRS Risk Score (Lifetime/Recent): Low Risk  Step 1: Risk Factors  Current & Past Psychiatric: depression/anxiety   Presenting Symptoms: excessive/uncontrolled worry, crying spells, helplessness   Precipitants/Stressors: academic/employment stressors, non- resident on student VISA  Change in Treatment: no  Access to Lethal Methods: No   Step 2: Protective Factors  Protective Factors Internal: reasons to live  Protective Factors External: therapeutic/supportive relationships   Step 3: Suicidal Ideation Intensity  Most Severe Suicidal Ideation Identified: suicidal thoughts   How Many Times Have You Had These Thoughts: Daily or almost daily (4)  When You Have the Thoughts How Long do They Last: less than 1 hour some of the time (2)  Could/Can You Stop Thinking About Killing yourself or wanting to Die: Can control the thoughts with some difficulty (3)  Are There Things - Anyone or Anything - That Stopped You from wanting to die or Acting on: Deterrents most certainly stopped you (1)  What Sort of Reasons Did You Have for Thinking about wanting to die or Killing Yourself: Mostly to end or stop the pain (4)  Total Score: 14  Step 5: Documentation  Risk Level: Risk rating: Moderate risk rating.  No current plan or intent to harm himself suggests low acute risk.      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  He has had escalating worry, crying spells, and upset in general as the time to begin the internship grows closer.  He did seek out help from his PCP and was started on Lexapro a few months ago with good benefit but did develop sexual side effects.  He thought he no longer needed the medication and stopped.  Wellbutrin was more recently started at 150 mg but he had a near syncope event and the dosage was then cut in half.  He did re-start the Lexapro 2 days ago.  Unfortunately, he is not working with psychiatry or a therapist and was told the wait would be at least 90 days before he could see someone outpatient here.  He has secured a German speaking therapist outside of the hospital in the near future.     In addition to crying spells, he reports feeling helpless with a lot of negative ruminations.  His mood is low, he finds it hard to focus and is prone to catastrophize.  His VISA is tied to his success in the program and the unsettled political climate in the country is exacerbating his anxiety.  He has no plan or intent to harm himself; and in general he is help seeking, future oriented and able to report on strong protective factors like his wife and family.  He is offered voluntary psychiatric admission to but declines this offer.  He is receptive to working with a psychiatrist later this morning in order to assess his medication and treatment needs.      Diagnosis: Adjustment Disorder with Mixed Anxiety and Depressed Mood    Outcome/Disposition: Pending assessment by psychiatrist   Patient's Perception of Outcome Achieved: Receptive   Assessment, Recommendations and Risk Level Reviewed with: Dr. Gurrola  EPAT Assessment Completed Date: 06/25/2025  EPAT Assessment Completed Time: 22:40      Safety Planning  A discussion  with the patient was conducted to review signs and symptoms of psychiatric crisis. Patient can report the ability to conduct himself safely.  He reports if in distress he is always able to seek out his wife (Bonita Cardona [phone]) for support. He understands that if he experiencing SI or psychiatric emergency he can call [phone], or return to the Emergency Department.

## 2025-06-26 NOTE — PROGRESS NOTES
Emergency Department Transition of Care Note       Signout   I received Bradley Mcguire in signout from Dr. Lenore Reeder.  Please see the ED Provider Note for all HPI, PE and MDM up to the time of signout at 0700.  This is in addition to the primary record.    In brief Bradley Mcguire is an 25 y.o. male presenting for suicidal ideation.  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      At the time of signout we were awaiting:  EPAT attending evaluation and final disposition    ED Course & Medical Decision Making   Medical Decision Making:  Under my care, patient was reevaluated and is resting comfortably in bed in no acute distress.  He continues to display depressive symptoms with suicidal ideation and a plan.  The psychiatric attending did come and evaluate the patient and is recommending inpatient psychiatric admission.  The patient is medically cleared for transfer and will await for placement details.    ED Course:  ED Course as of 06/27/25 1036   Wed Jun 25, 2025   1714 ECG 12 lead  EKG shows normal sinus rhythm, rate 86, , QRS 76, QTc 428, normal axis deviation, nonactionable, flattening in aVL, no previous EKGs available for comparison [JH]   2324 EPAT wants to keep overnight for psych attending to see in AM. Pt is agreeable [SS]   Thu Jun 26, 2025   1558 Patient is cleared for psychiatric placement [IB]      ED Course User Index  [IB] Ortiz Miranda MD  [JH] Jerry Fonseca MD  [SS] Luiza Donato MD         Diagnoses as of 06/27/25 1036   Suicidal ideation       Disposition   The patient is awaiting placement by the ED behavioral health team.  A pink slip and transfer note will be placed in the chart.  We will continue to  monitor and manage the patient in the emergency department until transport for the transfer can be arranged.    Procedures   Procedures    Patient seen and discussed with ED attending physician.    Thomas Philippe, DO  Emergency Medicine    ** Please excuse any errors in grammar or translation related to this dictation. Voice recognition software was utilized to prepare this document. **       Todd Baker MD  [facility] Emergency Medicine

## 2025-06-26 NOTE — CONSULTS
"EMERGENCY PSYCHIATRY CONSULTATION NOTE    Patient was identified by name and .    HISTORY OF PRESENT ILLNESS:  Bradley Mcguire is a 25 y.o. male with a past psychiatric history of unspecified anxiety and no significant past medical history [facility]. The patient was medically cleared for evaluation, and EPAT was consulted for evaluation.    On chart review, Mr. Carmona presented to the  ED on  after being sent to the ED by his residency . 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 Per evaluation from [facility] in the ED, the patient began having suicidal thoughts due to being extremely upset and anxious, and has been \"increasingly distressed including crying spells, excessive/uncontrolled worry, and ruminations.\" Per previous documentation, the patient does not currently see a psychiatrist. He is currently being cared for by his PCP, Dr. Jyothi Mcintyre, and was previously on Lexapro 20 mg in  before discontinuing the medication due to sexual side effects.     On interview, Mr. Carmona is cooperative and intermittently tearful while sitting in bed in the emergency department. 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 He states that over the past year, he applied for radiology residency positions in the [place] and matched into radiology here at . However, he states that he was upset to learn that rather than matching into an internal medicine transitional year that he had matched " "into a general surgery preliminary year that he would have to complete prior to beginning radiology training. Mr. Carmona states that he has recently discovered that this program is very intense, with residents working 12 straight days of 13+ hours shifts, and that he is very upset and anxious about not being able to make it through the program. He states that he is stressed about failing out of the program if he is not able to make it through, and reports that he would then be deported back to [place] without a visa, where his mother lives in a very poor financial situation. Due to these stressors, he states that he has begun having thoughts of suicide, stating that he has passive SI with no intent, but says he \"wishes someone would shoot me in the head.\" He expresses hopelessness, endorsing that he \"doesn't feel like there's a way out.\" He expressed these symptoms to his , who advised the patient to come to the ED for treatment. Mr. Carmona reports that his program has been \"very kind and supportive.\"    He states that he no previous history of SI or suicide attempts, and has never been psychiatrically hospitalized in the past. He reports that he was on Lexapro 20 mg for 7 months in 2024 while he took his STEP examinations for residency, and states that he did well on it but experienced sexual side effects. Thus, he discontinued this medication at that time. The patient reports that recently, he was started on Bupropion 150 mg by his PCP, but that he had a near-syncopal episode, so he cut his dose to 75 mg. Additionally, he has restarted his Lexapro, stating that he is taking 10 mg daily, but has only been doing this for the \"last few days.\" He denies homicidal or violent, and denies any auditory or visual hallucinations at this time. At this time, he states that he is amenable to inpatient psychiatric admission, and reports no other concerns or symptoms at this time.       PSYCHIATRIC REVIEW OF " SYSTEMS - TBD  Depression: depressed mood, feelings of worthlessness or guilt, feelings of hopelessness, tearfulness, and recurrent thoughts of death or suicidal ideation  Anxiety: excessive worry that is difficult to control, restlessness or feeling keyed up or on edge, and worries of looming dangers/catastrophes  Maye: negative  Psychosis: negative  Delirium: negative     PSYCHIATRIC HISTORY  Prior diagnoses: Anxiety, unspecified  Prior hospitalizations: Denies  History of suicide attempts: Denies  History of self-harm: Denies  History of trauma/abuse/loss: Denies  History of violence: Denies    Current psychiatrist: Denies current psychiatrist. Mental health care managed by patient's primary care physician, Dr. Mcintyre.   Current outpatient treatment: Dr. Jyothi Mcintyre    Current psychiatric medications: Bupropion 75 mg daily, Lexapro 10 mg daily  Past psychiatric medications: Previously on Lexapro 20 mg daily for 7 months in 2024, discontinued due to sexual side effects; recently on Bupropion 150 mg daily and experienced near-syncope event, so patient decreased to Bupropion 75 mg daily.   Past psychiatric treatments: Denies   Family psychiatric history: Unknown       SUBSTANCE USE HISTORY   Tobacco: Denies  Alcohol: Denies  Cannabis: Denies    SOCIAL HISTORY  Current living situation: Patient living with wife in [place].   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 Otherwise denies close family relationships.   Employment: [employer] [place] (resident physician)  Marital status:    Children: Denies  Social support: Wife and  "mother  Christian/Spirituality: Denies Sabianism affiliation but believes he will go to [facility] if he commits suicide  Access to weapons: Denies    PAST MEDICAL HISTORY  Medical History[1]     FAMILY HISTORY  Family History[2]     ALLERGIES  Patient has no known allergies.    OARRS REVIEW  OARRS checked: No data recorded    OBJECTIVE    VITALS      5/23/2025     5:02 PM 6/2/2025     8:42 AM 6/25/2025     5:05 PM 6/25/2025     5:06 PM 6/25/2025     8:16 PM 6/26/2025     1:20 AM 6/26/2025     8:39 AM   Vitals   Systolic 128 140  129 138 132 150   Diastolic 84 88  86 85 79 79   BP Location Right arm Right arm        Heart Rate 105 72 78  78 67 90   Temp 36.2 °C (97.1 °F) 35.6 °C (96 °F) 36.3 °C (97.3 °F)  36.1 °C (97 °F) 36.2 °C (97.2 °F) 36 °C (96.8 °F)   Resp   18  18 18 18   Height 1.829 m (6') 1.829 m (6')        Weight (lb) 304 297        BMI 41.23 kg/m2 40.28 kg/m2        BSA (m2) 2.65 m2 2.62 m2        Visit Report Report Report             MENTAL STATUS EXAM  Appearance: Intermittently tearful, in mild distress secondary to anxiety  Attitude: Largely remaining calm  Behavior: Cooperative  Motor Activity: No psychomotor agitation noted  Speech: Appropriate rate, rhythm, tone, and volume  Mood: \"I don't see a way out of this\"  Affect: Dysphoric, congruent with depressed mood.   Thought Process: Linear, organized, goal-seeking.   Thought Content:  Patient endorses passive SI with plan in mind, stating \"I just wish someone would shoot me in the head.\" Denies HI/VI.   Thought Perception: Denies auditory or visual hallucinations. Patient does not appear to be internally stimulated on exam.   Cognition: Appears intact.   Insight: Poor to fair insight, secondary to patient attempting to make plans but having difficulty secondary to hopelessness.   Judgement: Poor to fair, secondary to patient seeking help in the ED at the discretion of his , but with limited disposition-planning secondary to " hopelessness.    HOME MEDICATIONS  Medication Documentation Review Audit       Reviewed by Caro Dawkins MA (Medical Assistant) on 06/02/25 at 0842      Medication Order Taking? Sig Documenting Provider Last Dose Status   buPROPion (Wellbutrin) 75 mg tablet [id number]  Take 1 tablet (75 mg) by mouth 2 times a day. Saqib Moe MD  Active   levothyroxine (Synthroid, Levoxyl) 75 mcg tablet [id number]  Take 1 tablet (75 mcg) by mouth once daily. Saqib Moe MD  Active   metFORMIN XR (Glucophage-XR) 500 mg 24 hr tablet [id number]  Take 2 tablets (1,000 mg) by mouth once daily in the evening. Take with meals. Saqib Moe MD  Active   simvastatin (Zocor) 20 mg tablet [id number]  Take 1 tablet (20 mg) by mouth once daily. Yolanda Gamboa MD  Active                     CURRENT MEDICATIONS  Scheduled medications  Scheduled Medications[3]    Continuous medications  Continuous Medications[4]    PRN medications  PRN Medications[5]     LABS  Results for orders placed or performed during the hospital encounter of 06/25/25 (from the past 96 hours)   ECG 12 lead   Result Value Ref Range    Ventricular Rate 86 BPM    Atrial Rate 86 BPM    MO Interval 182 ms    QRS Duration 76 ms    QT Interval 358 ms    QTC Calculation(Bazett) 428 ms    P Axis 57 degrees    R Axis 87 degrees    T Axis 59 degrees    QRS Count 14 beats    Q Onset 213 ms    P Onset 122 ms    P Offset 170 ms    T Offset 392 ms    QTC Fredericia 403 ms   Urinalysis with Reflex Culture and Microscopic   Result Value Ref Range    Color, Urine Light-Yellow Light-Yellow, Yellow, Dark-Yellow    Appearance, Urine Clear Clear    Specific Gravity, Urine 1.027 1.005 - 1.035    pH, Urine 6.0 5.0, 5.5, 6.0, 6.5, 7.0, 7.5, 8.0    Protein, Urine NEGATIVE NEGATIVE, 10 (TRACE), 20 (TRACE) mg/dL    Glucose, Urine Normal Normal mg/dL    Blood, Urine 0.03 (TRACE) (A) NEGATIVE mg/dL    Ketones, Urine NEGATIVE NEGATIVE mg/dL    Bilirubin, Urine NEGATIVE NEGATIVE mg/dL     Urobilinogen, Urine Normal Normal mg/dL    Nitrite, Urine NEGATIVE NEGATIVE    Leukocyte Esterase, Urine NEGATIVE NEGATIVE   Urinalysis Microscopic   Result Value Ref Range    WBC, Urine NONE 1-5, NONE /HPF    RBC, Urine 1-2 NONE, 1-2, 3-5 /HPF    Squamous Epithelial Cells, Urine 1-9 (SPARSE) Reference range not established. /HPF    Mucus, Urine FEW Reference range not established. /LPF   Drug Screen, Urine   Result Value Ref Range    Amphetamine Screen, Urine Presumptive Negative Presumptive Negative    Barbiturate Screen, Urine Presumptive Negative Presumptive Negative    Benzodiazepines Screen, Urine Presumptive Negative Presumptive Negative    Cannabinoid Screen, Urine Presumptive Negative Presumptive Negative    Cocaine Metabolite Screen, Urine Presumptive Negative Presumptive Negative    Fentanyl Screen, Urine Presumptive Negative Presumptive Negative    Opiate Screen, Urine Presumptive Negative Presumptive Negative    Oxycodone Screen, Urine Presumptive Negative Presumptive Negative    PCP Screen, Urine Presumptive Negative Presumptive Negative    Methadone Screen, Urine Presumptive Negative Presumptive Negative   CBC and Auto Differential   Result Value Ref Range    WBC 8.4 4.4 - 11.3 x10*3/uL    nRBC 0.0 0.0 - 0.0 /100 WBCs    RBC 5.49 4.50 - 5.90 x10*6/uL    Hemoglobin 15.7 13.5 - 17.5 g/dL    Hematocrit 44.9 41.0 - 52.0 %    MCV 82 80 - 100 fL    MCH 28.6 26.0 - 34.0 pg    MCHC 35.0 32.0 - 36.0 g/dL    RDW 11.9 11.5 - 14.5 %    Platelets 337 150 - 450 x10*3/uL    Neutrophils % 69.6 40.0 - 80.0 %    Immature Granulocytes %, Automated 0.2 0.0 - 0.9 %    Lymphocytes % 20.6 13.0 - 44.0 %    Monocytes % 7.8 2.0 - 10.0 %    Eosinophils % 1.3 0.0 - 6.0 %    Basophils % 0.5 0.0 - 2.0 %    Neutrophils Absolute 5.86 1.20 - 7.70 x10*3/uL    Immature Granulocytes Absolute, Automated 0.02 0.00 - 0.70 x10*3/uL    Lymphocytes Absolute 1.74 1.20 - 4.80 x10*3/uL    Monocytes Absolute 0.66 0.10 - 1.00 x10*3/uL     Eosinophils Absolute 0.11 0.00 - 0.70 x10*3/uL    Basophils Absolute 0.04 0.00 - 0.10 x10*3/uL   Comprehensive metabolic panel   Result Value Ref Range    Glucose 107 (H) 74 - 99 mg/dL    Sodium 136 136 - 145 mmol/L    Potassium 4.0 3.5 - 5.3 mmol/L    Chloride 103 98 - 107 mmol/L    Bicarbonate 22 21 - 32 mmol/L    Anion Gap 15 10 - 20 mmol/L    Urea Nitrogen 13 6 - 23 mg/dL    Creatinine 0.90 0.50 - 1.30 mg/dL    eGFR >90 >60 mL/min/1.73m*2    Calcium 10.1 8.6 - 10.6 mg/dL    Albumin 5.1 (H) 3.4 - 5.0 g/dL    Alkaline Phosphatase 83 33 - 120 U/L    Total Protein 8.5 (H) 6.4 - 8.2 g/dL    AST 25 9 - 39 U/L    Bilirubin, Total 0.5 0.0 - 1.2 mg/dL    ALT 53 (H) 10 - 52 U/L   TSH with reflex to Free T4 if abnormal   Result Value Ref Range    Thyroid Stimulating Hormone 3.95 0.44 - 3.98 mIU/L   Acute Toxicology Panel, Blood   Result Value Ref Range    Acetaminophen <10.0 10.0 - 30.0 ug/mL    Salicylate  <3 4 - 20 mg/dL    Alcohol <10 <=10 mg/dL        IMAGING  Imaging  No results found.    Cardiology, Vascular, and Other Imaging  ECG 12 lead  Result Date: 6/26/2025  Normal sinus rhythm Normal ECG No previous ECGs available See ED provider note for full interpretation and clinical correlation Confirmed by Johny Elmore ([id number]) on 6/26/2025 3:33:10 AM       PSYCHIATRIC RISK ASSESSMENT  Violence Risk Factors:  male and current psychiatric illness  Acute Risk of Harm to Others is Considered: Low  Suicide Risk Factors: male, current psychiatric illness, feelings of hopelessness, and severe anxiety, akathisia, or panic  Protective Factors: Catholic affiliation/spirituality, sense of responsibility towards family, positive family relationships, and marriage/partnership  Acute Risk of Harm to Self is Considered: Moderate    ASSESSMENT AND PLAN  Bradley Whitten Kalee Mcguire is a 25 y.o. male with a past psychiatric history of unspecified anxiety and no significant past medical history who presented to the  [facility] ED on 6/25/25 for suicidal ideation. The patient was medically cleared for evaluation, and [facility]    On initial assessment, Mr. Carmona appeared anxious and in mild distress secondary to endorsed anxiety and hopelessness, becoming intermittently tearful throughout the interview. He endorses passive suicidal ideation, but has a plan in mind, stating that he \"wishes that someone would shoot him.\" At this time, the patient meets criteria for involuntary inpatient psychiatric admission due to risk of harm to himself, and is willing to be admitted voluntarily at this time. EPAT is currently seeking placement for inpatient psychiatric admission. At this time, the patient should continue his home medications of Lexapro 10 mg and Bupropion 75 mg daily, and further medication management should be deferred to the patient's inpatient psychiatric team upon admission.     IMPRESSION  #Suicidal ideation, passive with plan  #Anxiety, unspecified    RECOMMENDATIONS  Safety:  - Patient does currently meet criteria for inpatient psychiatric admission. Once patient is deemed medically cleared, please document in note that patient is MEDICALLY CLEARED and contact [facility] for referral at [phone], pager [phone]. Issue Application for Emergency Admission (pink slip) only after patient is accepted to an inpatient psychiatric unit and is ready to be discharged. Search \"Application for Emergency Admission\" under [employer].  - Patient lacks the capacity to leave AMA at this time and thus cannot leave AMA. Call [facility] if patient attempts to leave AMA.  - Patient has agreed to sign in voluntarily but also does meet criteria for involuntary inpatient psychiatric admission.     Medications:  - Continue home medications of Lexapro 10 mg and Bupropion 75 mg daily    Follow-up:  - EPAT currently seeking placement for inpatient psychiatric admission.     - Discussed recommendations with current ED " clinician.    Patient seen and staffed with Dr. Chaves, who agrees with above plan.    Celestine Jang DO          [1] No past medical history on file.  [2] No family history on file.  [3] [4] [5]

## 2025-06-27 ENCOUNTER — HOSPITAL ENCOUNTER (INPATIENT)
Facility: HOSPITAL | Age: 25
DRG: 885 | End: 2025-06-27
Attending: PSYCHIATRY & NEUROLOGY | Admitting: PSYCHIATRY & NEUROLOGY
Payer: COMMERCIAL

## 2025-06-27 VITALS
RESPIRATION RATE: 16 BRPM | TEMPERATURE: 97.7 F | BODY MASS INDEX: 40.23 KG/M2 | SYSTOLIC BLOOD PRESSURE: 124 MMHG | HEART RATE: 72 BPM | OXYGEN SATURATION: 100 % | WEIGHT: 297 LBS | DIASTOLIC BLOOD PRESSURE: 85 MMHG | HEIGHT: 72 IN

## 2025-06-27 DIAGNOSIS — R73.03 PREDIABETES: ICD-10-CM

## 2025-06-27 DIAGNOSIS — F33.2 SEVERE RECURRENT MAJOR DEPRESSION WITHOUT PSYCHOTIC FEATURES (MULTI): Primary | ICD-10-CM

## 2025-06-27 DIAGNOSIS — F41.1 GAD (GENERALIZED ANXIETY DISORDER): ICD-10-CM

## 2025-06-27 DIAGNOSIS — E03.8 SUBCLINICAL HYPOTHYROIDISM: ICD-10-CM

## 2025-06-27 DIAGNOSIS — E78.5 DYSLIPIDEMIA: ICD-10-CM

## 2025-06-27 PROBLEM — F43.23 ADJUSTMENT DISORDER WITH MIXED ANXIETY AND DEPRESSED MOOD: Status: ACTIVE | Noted: 2025-06-27

## 2025-06-27 PROBLEM — F43.20 ADJUSTMENT DISORDER: Status: ACTIVE | Noted: 2025-06-27

## 2025-06-27 PROBLEM — E03.9 ACQUIRED HYPOTHYROIDISM: Status: ACTIVE | Noted: 2024-01-16

## 2025-06-27 LAB — GLUCOSE BLD MANUAL STRIP-MCNC: 120 MG/DL (ref 74–99)

## 2025-06-27 PROCEDURE — 2500000001 HC RX 250 WO HCPCS SELF ADMINISTERED DRUGS (ALT 637 FOR MEDICARE OP)

## 2025-06-27 PROCEDURE — G0378 HOSPITAL OBSERVATION PER HR: HCPCS

## 2025-06-27 PROCEDURE — 82947 ASSAY GLUCOSE BLOOD QUANT: CPT

## 2025-06-27 PROCEDURE — 1140000001 HC PRIVATE PSYCH ROOM DAILY

## 2025-06-27 PROCEDURE — 99254 IP/OBS CNSLTJ NEW/EST MOD 60: CPT | Performed by: INTERNAL MEDICINE

## 2025-06-27 PROCEDURE — 2500000001 HC RX 250 WO HCPCS SELF ADMINISTERED DRUGS (ALT 637 FOR MEDICARE OP): Performed by: INTERNAL MEDICINE

## 2025-06-27 PROCEDURE — 2500000002 HC RX 250 W HCPCS SELF ADMINISTERED DRUGS (ALT 637 FOR MEDICARE OP, ALT 636 FOR OP/ED): Performed by: INTERNAL MEDICINE

## 2025-06-27 PROCEDURE — 99223 1ST HOSP IP/OBS HIGH 75: CPT

## 2025-06-27 RX ORDER — MIRTAZAPINE 7.5 MG/1
7.5 TABLET, FILM COATED ORAL NIGHTLY
Status: COMPLETED | OUTPATIENT
Start: 2025-06-27 | End: 2025-06-27

## 2025-06-27 RX ORDER — HYDROXYZINE HYDROCHLORIDE 25 MG/1
25 TABLET, FILM COATED ORAL EVERY 6 HOURS PRN
Status: DISCONTINUED | OUTPATIENT
Start: 2025-06-27 | End: 2025-07-01 | Stop reason: HOSPADM

## 2025-06-27 RX ORDER — ADHESIVE BANDAGE
30 BANDAGE TOPICAL DAILY PRN
Status: DISCONTINUED | OUTPATIENT
Start: 2025-06-27 | End: 2025-07-01 | Stop reason: HOSPADM

## 2025-06-27 RX ORDER — OLANZAPINE 5 MG/1
5 TABLET, FILM COATED ORAL EVERY 6 HOURS PRN
Status: DISCONTINUED | OUTPATIENT
Start: 2025-06-27 | End: 2025-07-01 | Stop reason: HOSPADM

## 2025-06-27 RX ORDER — SIMVASTATIN 20 MG/1
20 TABLET, FILM COATED ORAL DAILY
Status: DISCONTINUED | OUTPATIENT
Start: 2025-06-27 | End: 2025-07-01 | Stop reason: HOSPADM

## 2025-06-27 RX ORDER — METFORMIN HYDROCHLORIDE 500 MG/1
1000 TABLET, EXTENDED RELEASE ORAL
Status: DISCONTINUED | OUTPATIENT
Start: 2025-06-27 | End: 2025-07-01 | Stop reason: HOSPADM

## 2025-06-27 RX ORDER — ALUMINUM HYDROXIDE, MAGNESIUM HYDROXIDE, AND SIMETHICONE 2400; 240; 2400 MG/30ML; MG/30ML; MG/30ML
30 SUSPENSION ORAL EVERY 6 HOURS PRN
Status: DISCONTINUED | OUTPATIENT
Start: 2025-06-27 | End: 2025-07-01 | Stop reason: HOSPADM

## 2025-06-27 RX ORDER — LEVOTHYROXINE SODIUM 75 UG/1
75 TABLET ORAL DAILY
Status: DISCONTINUED | OUTPATIENT
Start: 2025-06-27 | End: 2025-07-01 | Stop reason: HOSPADM

## 2025-06-27 RX ORDER — MIRTAZAPINE 15 MG/1
15 TABLET, FILM COATED ORAL NIGHTLY
Status: DISCONTINUED | OUTPATIENT
Start: 2025-06-28 | End: 2025-07-01 | Stop reason: HOSPADM

## 2025-06-27 RX ORDER — TRAZODONE HYDROCHLORIDE 50 MG/1
50 TABLET ORAL NIGHTLY PRN
Status: DISCONTINUED | OUTPATIENT
Start: 2025-06-27 | End: 2025-07-01 | Stop reason: HOSPADM

## 2025-06-27 RX ORDER — ACETAMINOPHEN 325 MG/1
650 TABLET ORAL EVERY 4 HOURS PRN
Status: DISCONTINUED | OUTPATIENT
Start: 2025-06-27 | End: 2025-07-01 | Stop reason: HOSPADM

## 2025-06-27 RX ORDER — ESCITALOPRAM OXALATE 20 MG/1
20 TABLET ORAL DAILY
COMMUNITY
End: 2025-07-01 | Stop reason: HOSPADM

## 2025-06-27 RX ORDER — OLANZAPINE 10 MG/2ML
5 INJECTION, POWDER, FOR SOLUTION INTRAMUSCULAR EVERY 6 HOURS PRN
Status: DISCONTINUED | OUTPATIENT
Start: 2025-06-27 | End: 2025-07-01 | Stop reason: HOSPADM

## 2025-06-27 RX ADMIN — SIMVASTATIN 20 MG: 20 TABLET, FILM COATED ORAL at 20:21

## 2025-06-27 RX ADMIN — METFORMIN ER 500 MG 1000 MG: 500 TABLET ORAL at 17:10

## 2025-06-27 RX ADMIN — MIRTAZAPINE 7.5 MG: 7.5 TABLET, FILM COATED ORAL at 20:21

## 2025-06-27 RX ADMIN — ESCITALOPRAM OXALATE 10 MG: 10 TABLET ORAL at 08:19

## 2025-06-27 RX ADMIN — LEVOTHYROXINE SODIUM 75 MCG: 0.07 TABLET ORAL at 16:25

## 2025-06-27 SDOH — ECONOMIC STABILITY: INCOME INSECURITY: IN THE PAST 12 MONTHS HAS THE ELECTRIC, GAS, OIL, OR WATER COMPANY THREATENED TO SHUT OFF SERVICES IN YOUR HOME?: NO

## 2025-06-27 SDOH — SOCIAL STABILITY: SOCIAL INSECURITY
WITHIN THE LAST YEAR, HAVE YOU BEEN KICKED, HIT, SLAPPED, OR OTHERWISE PHYSICALLY HURT BY YOUR PARTNER OR EX-PARTNER?: NO

## 2025-06-27 SDOH — ECONOMIC STABILITY: FOOD INSECURITY: WITHIN THE PAST 12 MONTHS, YOU WORRIED THAT YOUR FOOD WOULD RUN OUT BEFORE YOU GOT THE MONEY TO BUY MORE.: NEVER TRUE

## 2025-06-27 SDOH — ECONOMIC STABILITY: FOOD INSECURITY: WITHIN THE PAST 12 MONTHS, THE FOOD YOU BOUGHT JUST DIDN'T LAST AND YOU DIDN'T HAVE MONEY TO GET MORE.: NEVER TRUE

## 2025-06-27 SDOH — ECONOMIC STABILITY: FOOD INSECURITY: HOW HARD IS IT FOR YOU TO PAY FOR THE VERY BASICS LIKE FOOD, HOUSING, MEDICAL CARE, AND HEATING?: NOT HARD AT ALL

## 2025-06-27 SDOH — SOCIAL STABILITY: SOCIAL INSECURITY: ARE YOU OR HAVE YOU BEEN THREATENED OR ABUSED PHYSICALLY, EMOTIONALLY, OR SEXUALLY BY ANYONE?: NO

## 2025-06-27 SDOH — SOCIAL STABILITY: SOCIAL INSECURITY: WITHIN THE LAST YEAR, HAVE YOU BEEN AFRAID OF YOUR PARTNER OR EX-PARTNER?: NO

## 2025-06-27 SDOH — ECONOMIC STABILITY: HOUSING INSECURITY: AT ANY TIME IN THE PAST 12 MONTHS, WERE YOU HOMELESS OR LIVING IN A SHELTER (INCLUDING NOW)?: NO

## 2025-06-27 SDOH — SOCIAL STABILITY: SOCIAL INSECURITY: DO YOU FEEL ANYONE HAS EXPLOITED OR TAKEN ADVANTAGE OF YOU FINANCIALLY OR OF YOUR PERSONAL PROPERTY?: NO

## 2025-06-27 SDOH — SOCIAL STABILITY: SOCIAL INSECURITY: WITHIN THE LAST YEAR, HAVE YOU BEEN HUMILIATED OR EMOTIONALLY ABUSED IN OTHER WAYS BY YOUR PARTNER OR EX-PARTNER?: NO

## 2025-06-27 SDOH — ECONOMIC STABILITY: GENERAL

## 2025-06-27 SDOH — SOCIAL STABILITY: SOCIAL INSECURITY
WITHIN THE LAST YEAR, HAVE YOU BEEN RAPED OR FORCED TO HAVE ANY KIND OF SEXUAL ACTIVITY BY YOUR PARTNER OR EX-PARTNER?: NO

## 2025-06-27 SDOH — HEALTH STABILITY: MENTAL HEALTH: HOW OFTEN DO YOU HAVE SIX OR MORE DRINKS ON ONE OCCASION?: NEVER

## 2025-06-27 SDOH — ECONOMIC STABILITY: HOUSING INSECURITY: IN THE LAST 12 MONTHS, WAS THERE A TIME WHEN YOU WERE NOT ABLE TO PAY THE MORTGAGE OR RENT ON TIME?: NO

## 2025-06-27 SDOH — HEALTH STABILITY: MENTAL HEALTH: HOW OFTEN DO YOU HAVE A DRINK CONTAINING ALCOHOL?: NEVER

## 2025-06-27 SDOH — HEALTH STABILITY: MENTAL HEALTH: MENTAL HEALTH TREATMENT: MEDICATION;COUNSELING

## 2025-06-27 SDOH — SOCIAL STABILITY: SOCIAL INSECURITY: ARE THERE ANY APPARENT SIGNS OF INJURIES/BEHAVIORS THAT COULD BE RELATED TO ABUSE/NEGLECT?: NO

## 2025-06-27 SDOH — SOCIAL STABILITY: SOCIAL INSECURITY: DO YOU FEEL UNSAFE GOING BACK TO THE PLACE WHERE YOU ARE LIVING?: NO

## 2025-06-27 SDOH — ECONOMIC STABILITY: HOUSING INSECURITY: IN THE PAST 12 MONTHS, HOW MANY TIMES HAVE YOU MOVED WHERE YOU WERE LIVING?: 1

## 2025-06-27 SDOH — HEALTH STABILITY: MENTAL HEALTH: MAJOR CHANGE/ LOSS/ STRESSOR: EMPLOYMENT CONCERNS

## 2025-06-27 SDOH — SOCIAL STABILITY: SOCIAL INSECURITY: DOES ANYONE TRY TO KEEP YOU FROM HAVING/CONTACTING OTHER FRIENDS OR DOING THINGS OUTSIDE YOUR HOME?: NO

## 2025-06-27 SDOH — HEALTH STABILITY: MENTAL HEALTH

## 2025-06-27 SDOH — HEALTH STABILITY: MENTAL HEALTH: HOW MANY DRINKS CONTAINING ALCOHOL DO YOU HAVE ON A TYPICAL DAY WHEN YOU ARE DRINKING?: PATIENT DOES NOT DRINK

## 2025-06-27 SDOH — SOCIAL STABILITY: SOCIAL INSECURITY: HAVE YOU HAD THOUGHTS OF HARMING ANYONE ELSE?: NO

## 2025-06-27 SDOH — SOCIAL STABILITY: SOCIAL INSECURITY: HAS ANYONE EVER THREATENED TO HURT YOUR FAMILY OR YOUR PETS?: NO

## 2025-06-27 SDOH — ECONOMIC STABILITY: HOUSING INSECURITY: FEELS SAFE LIVING IN HOME: YES

## 2025-06-27 SDOH — HEALTH STABILITY: MENTAL HEALTH: PREVIOUS MENTAL HEALTH TREATMENT: MEDICATION;COUNSELING

## 2025-06-27 SDOH — SOCIAL STABILITY: SOCIAL INSECURITY: HAVE YOU HAD ANY THOUGHTS OF HARMING ANYONE ELSE?: NO

## 2025-06-27 SDOH — HEALTH STABILITY: MENTAL HEALTH: ANXIETY SYMPTOMS: GENERALIZED

## 2025-06-27 SDOH — HEALTH STABILITY: MENTAL HEALTH: BEHAVIOR: ORIENTED

## 2025-06-27 SDOH — SOCIAL STABILITY: SOCIAL INSECURITY: RELIGIOUS/CULTURAL FACTORS: NONE REPORTED

## 2025-06-27 SDOH — SOCIAL STABILITY: SOCIAL INSECURITY: ABUSE: ADULT

## 2025-06-27 SDOH — ECONOMIC STABILITY: TRANSPORTATION INSECURITY: IN THE PAST 12 MONTHS, HAS LACK OF TRANSPORTATION KEPT YOU FROM MEDICAL APPOINTMENTS OR FROM GETTING MEDICATIONS?: NO

## 2025-06-27 SDOH — HEALTH STABILITY: MENTAL HEALTH: DEPRESSION SYMPTOMS: NO PROBLEMS REPORTED OR OBSERVED.

## 2025-06-27 SDOH — SOCIAL STABILITY: SOCIAL INSECURITY: WERE YOU ABLE TO COMPLETE ALL THE BEHAVIORAL HEALTH SCREENINGS?: YES

## 2025-06-27 ASSESSMENT — LIFESTYLE VARIABLES
ANXIETY: MILDLY ANXIOUS
TREMOR: NO TREMOR
AUDIT-C TOTAL SCORE: 0
HOW OFTEN DO YOU HAVE A DRINK CONTAINING ALCOHOL: NEVER
CIWA TOTAL SCORE: 1
TOTAL_SCORE: 2
HEADACHE, FULLNESS IN HEAD: NOT PRESENT
SKIP TO QUESTIONS 9-10: 1
AUDIT-C TOTAL SCORE: 0
SUBSTANCE_ABUSE_PAST_12_MONTHS: NO
PAROXYSMAL SWEATS: NO SWEAT VISIBLE
NAUSEA AND VOMITING: NO NAUSEA AND NO VOMITING
AUDITORY DISTURBANCES: NOT PRESENT
HOW MANY STANDARD DRINKS CONTAINING ALCOHOL DO YOU HAVE ON A TYPICAL DAY: PATIENT DOES NOT DRINK
SUBSTANCE_ABUSE_PAST_12_MONTHS: NO
ORIENTATION AND CLOUDING OF SENSORIUM: ORIENTED AND CAN DO SERIAL ADDITIONS
SKIP TO QUESTIONS 9-10: 1
PRESCIPTION_ABUSE_PAST_12_MONTHS: NO
VISUAL DISTURBANCES: NOT PRESENT
AGITATION: NORMAL ACTIVITY
AUDIT-C TOTAL SCORE: 0
HOW OFTEN DO YOU HAVE 6 OR MORE DRINKS ON ONE OCCASION: NEVER
PRESCIPTION_ABUSE_PAST_12_MONTHS: NO

## 2025-06-27 ASSESSMENT — ACTIVITIES OF DAILY LIVING (ADL)
TOILETING: INDEPENDENT
GROOMING: INDEPENDENT
WALKS IN HOME: INDEPENDENT
HEARING - RIGHT EAR: FUNCTIONAL
ASSISTIVE_DEVICE: EYEGLASSES
FEEDING YOURSELF: INDEPENDENT
HEARING - LEFT EAR: FUNCTIONAL
PATIENT'S MEMORY ADEQUATE TO SAFELY COMPLETE DAILY ACTIVITIES?: YES
LACK_OF_TRANSPORTATION: NO
BATHING: INDEPENDENT
JUDGMENT_ADEQUATE_SAFELY_COMPLETE_DAILY_ACTIVITIES: YES
DRESSING YOURSELF: INDEPENDENT
BATHING: NO ASSISTANCE
ADEQUATE_TO_COMPLETE_ADL: YES
LACK_OF_TRANSPORTATION: NO

## 2025-06-27 ASSESSMENT — PAIN SCALES - GENERAL
PAINLEVEL_OUTOF10: 0 - NO PAIN
PAINLEVEL_OUTOF10: 0 - NO PAIN

## 2025-06-27 ASSESSMENT — PAIN - FUNCTIONAL ASSESSMENT
PAIN_FUNCTIONAL_ASSESSMENT: 0-10
PAIN_FUNCTIONAL_ASSESSMENT: 0-10

## 2025-06-27 ASSESSMENT — PATIENT HEALTH QUESTIONNAIRE - PHQ9
SUM OF ALL RESPONSES TO PHQ9 QUESTIONS 1 & 2: 1
2. FEELING DOWN, DEPRESSED OR HOPELESS: SEVERAL DAYS
1. LITTLE INTEREST OR PLEASURE IN DOING THINGS: NOT AT ALL

## 2025-06-27 NOTE — GROUP NOTE
Group Topic: Other   Group Date: 6/27/2025  Start Time: 1330  End Time: 1430  Facilitators: NESS BoltonS   Department: [facility]    Number of Participants: 9  Group Focus: leisure skills, self-esteem, and social skills  Treatment Modality: Leisure Development and Other: Recreation Therapy  Interventions utilized were group exercise, leisure development, and mental fitness  Purpose: Patients engaged in a group session with game of “"Mosec, Mobile Secretary"” with questions which aims to stimulate cognition, leisure development and social interaction. Group game also aims to increase memory by way of reminiscing, direction following, fine motor skills and eye-hand coordination  Name: Bradley Mcguire YOB: 2000   MR: [id number]      Facilitator: Recreational Therapist  Level of Participation: did not attend  Progress: None  Comments: pt problem is depressed mood. Pt speaking with provider. No handout to offer.   Plan: continue with services

## 2025-06-27 NOTE — CARE PLAN
The patient's goals for the shift include Talk with the doctor    The clinical goals for the shift include Attend groups and maintain patient safety    Problem: Pain - Adult  Goal: Verbalizes/displays adequate comfort level or baseline comfort level  Outcome: Progressing     Problem: Safety - Adult  Goal: Free from fall injury  Outcome: Progressing     Problem: Nutrition  Goal: Nutrient intake appropriate for maintaining nutritional needs  Outcome: Progressing     Problem: Potential for Harm to Self or Others  Goal: Cooperates with admission process  Outcome: Progressing  Goal: Participates in unit activities  Outcome: Progressing  Goal: Denies harm toward self or others  Outcome: Progressing  Goal: Free from restraint events  Outcome: Progressing     Problem: Self Care Deficit  Goal: Increase group attendance  Outcome: Progressing  Goal: STG - Goes to and eats meals independently in dining room 100% of time  Outcome: Progressing

## 2025-06-27 NOTE — PROGRESS NOTES
Psychosocial assessment completed with pt and from medical records. Pt is a 25 yr old [place] male admitted for SI. Pt has hx of anxiety and depression with prescribed mediation by PCP, with recent change 4 days ago. [place] 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 As the start of the program has gotten closer has pt began having a lot of anxiety which immerged into SI with intrusive thoughts but no plan. Pt made statements recently to the effect of wishing ' someone to shoot him in the head' and ' there is no way out'. [place] the [place] on [place] which he will loose if he is unsuccessful in the program. Pt has been distressed by the pressure that he has been having crying spells and uncontrolled worry. Pt has been ruminating and catastrophizing the situation.  Pt does have a therapist in [place] whom he meets with via [employer]. Pt lives with is wife who is supportive. As well as he has supportive family in [place]. Pt was able to speak with his director of the program today and is understanding of the situation. Pt is able to delay his start of the program for a few weeks while hospitalized and is able to get services and supports arranged. This helped put pt more at ease. Pt is agreeable to outpatient services. Pt reports no AVH and currently denies SI. Pt is A&Ox4. Pt is pleasant and cooperative. SW to follow for structure, support and discharge planning.     CHRISTIAN MillerW

## 2025-06-27 NOTE — GROUP NOTE
Group Topic: Other   Group Date: 6/27/2025  Start Time: 1030  End Time: 1130  Facilitators: Megan RANKIN CTRS   Department: [facility]    Number of Participants: 11   Group Focus: communication, self-esteem, and social skills  Treatment Modality: Leisure Development and Other: Recreation Therapy  Interventions utilized were exploration, group exercise, leisure development, reminiscence, and support  Purpose: In this group patients were prompted to engage together in a therapeutic activity which aims to promote increased socialization, motivation, physical activity level, communication and following directions along utilizing balance, ROM and motor skills. CTRS integrated music to this activity to enhance mood and promote memory stimulatio    Name: Bradley Pooleira YOB: 2000   MR: [id number]      Facilitator: Recreational Therapist  Level of Participation: active  Quality of Participation: appropriate/pleasant, attentive, cooperative, and engaged  Interactions with others: appropriate  Mood/Affect: appropriate and brightens with interaction  Cognition: coherent/clear  Progress: Moderate  Comments: pt problem is depressed mood. Pt joins group with little encouragement. Engages easily with staff and peers. Displays good attention to task, cooperative participation. Leaves early to speak with Psychiatry Resident.   Plan: continue with services

## 2025-06-27 NOTE — H&P
The reason for admission includes: Suicidal thoughts and depression worse.  Onset of symptoms was abrupt starting 2 weeks ago with rapidly worsening course since that time. Psychosocial Stressors: occupational.            History Of Present Illness  Bradley Sparrowarelis Mathew Mcguire is a 25 y.o. male with a history of anxiety and prediabetes presenting with suicidal ideations secondary to occupational demands.  Patient originally presented to [facility] emergency department on 6/25 for suicidal ideation after patient's wife contacted residency program coordinator to disclose patient had been having suicidal thoughts.  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  As such, patient became extremely upset and anxious and has been experiencing crying spells with uncontrolled worry and ruminations.  Patient reports that within recent years, while studying for step exams related to medical training, patient also had suicidal thoughts.  He was given psychiatric medications by his primary care provider and denies that he has been previously linked with nor has been seen by a psychiatrist.  He also reports that he has not previously been psychiatrically admitted.  He denies other suicidality beyond this and he denies suicide attempts in his history.    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  Notes he is set to begin radiology residency but underwent the match process which resulted in him learning he would have to complete a preliminary surgery year.  Patient discloses this caused a great deal of stress and anxiety which resulted in him reaching out to friends, family, and support system which includes family in [place].  " Patient reports cycles in which he would become over whelmed with emotions and discussed accordingly with family and friends prior to resolution and undergo this cycle several times over the past 2 weeks until Wednesday in which patient began to develop suicidal ideations stating \"if I killed myself then I just went had to deal with this anymore.\"  Patient confides in his wife regularly and lists her as a major support during this time, she reached out to his residency program coordinator and disclosed suicidal ideation at which point patient was urged to seek care at the emergency department at [facility].  He discloses \"I did not know what else to do, I knew I had to do something.\"  Patient describes \"I cannot believe I got a surgery preliminary, I know my life is going to change and I am going to have to work 12 out of 13 days with only 1 day off.\"  He states the work our demand is going to be \"extremely difficult and pushed me to the edge.\"  Patient denies any access to firearms, current suicidal thoughts, or homicidal thoughts.  Patient also denies any hallucinations of any kind.  He denies having developed any plan to commit suicide initially when asked and then when asked again contributed an idea to find someone to inflict a fatal gun shot wound to him.    When asked, patient mentions \"I am currently feeling better, I feel like I am able to do okay today.\"  Discussed with patient risk factors regarding suicide and medical and surgical residents are higher risk than population and patient is likely at higher risk.  Patient is agreeable and expresses understanding.  Patient cites several stressors in life includingwork academic success, and lack of efficacy regarding current psychotropic medications after dosage decreases due to unwanted side effects.  Patient mentions he follows with PCP who prescribes Wellbutrin and Lexapro for anxiety and depression, although these medications were lowered for " "reasons stated above.  Patient informs this author of a strong desire to return home from the hospital setting due to \"I have a lot of things to do, I want to get out of here so I can take care of them.\"  Discussed with patient nature of psychiatric hospitalization.    ED Note  In brief Bradley Thapenny Mathew Mcguire is an 25 y.o. male presenting for suicidal ideation.  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      Emergency Psychiatry Consultation Note  On chart review, Mr. Carmona presented to the  ED on 6/25 after being sent to the ED by his residency . 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 Per evaluation from [facility] in the ED, the patient began having suicidal thoughts due to being extremely upset and anxious, and has been \"increasingly distressed including crying spells, excessive/uncontrolled worry, and ruminations.\" Per previous documentation, the patient does not currently see a psychiatrist. He is currently being cared for by his PCP, Dr. Jyothi Mcintyre, and was previously on Lexapro 20 mg in 2024 before discontinuing the medication due to sexual side effects.     On chart review, Mr. Carmona presented to the  ED on 6/25 after being sent to the ED by his residency . 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 Per evaluation from [facility] in the ED, the patient began having suicidal thoughts due to being extremely upset and anxious, and has been \"increasingly distressed including crying spells, excessive/uncontrolled worry, and ruminations.\" Per previous documentation, the patient does not currently see a psychiatrist. He is currently being cared for by his PCP, Dr. Jyothi Mcintyre, and was previously on Lexapro 20 mg in 2024 before discontinuing the medication due to sexual side effects.      On interview, Mr. Carmona is cooperative and intermittently tearful while sitting in bed in the emergency department. 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 He states that over the past year, he applied for radiology residency positions in [place] and matched into radiology here at . However, he states that he was upset to learn that rather than matching into an internal medicine transitional year that he had matched into a general surgery preliminary year that he would have to complete prior to beginning radiology training. Mr. Carmona states that he has recently discovered that this program is very intense, with residents working 12 straight days of 13+ hours shifts, and that he is very upset and anxious about not being able to make it through the program. He states that he is stressed about failing out of the program if he is not able to make it through, and reports that he would then be deported back to [place] without a visa, where his mother lives in a very poor financial situation. Due to these stressors, he states that he has begun having thoughts of suicide, stating that he has passive SI with no intent, but says he " "\"wishes someone would shoot me in the head.\" He expresses hopelessness, endorsing that he \"doesn't feel like there's a way out.\" He expressed these symptoms to his , who advised the patient to come to the ED for treatment. Mr. Carmona reports that his program has been \"very kind and supportive.\"     He states that he no previous history of SI or suicide attempts, and has never been psychiatrically hospitalized in the past. He reports that he was on Lexapro 20 mg for 7 months in 2024 while he took his STEP examinations for residency, and states that he did well on it but experienced sexual side effects. Thus, he discontinued this medication at that time. The patient reports that recently, he was started on Bupropion 150 mg by his PCP, but that he had a near-syncopal episode, so he cut his dose to 75 mg. Additionally, he has restarted his Lexapro, stating that he is taking 10 mg daily, but has only been doing this for the \"last few days.\" He denies homicidal or violent, and denies any auditory or visual hallucinations at this time. At this time, he states that he is amenable to inpatient psychiatric admission, and reports no other concerns or symptoms at this time.     Past Medical History  He has no past medical history on file.    Past Psychiatric History:   Previous therapy: yes  Previous psychiatric treatment and medication trials: yes - Wellbutrin 150 mg, decreased to 75 mg daily due to vasovagal episode, restlessness, Lexapro 20 mg daily while studying for STEP exams, decreased to 10 mg due to unwanted sexual side effects  Previous psychiatric hospitalizations: no  Previous diagnoses: yes - depression, anxiety  Previous suicide attempts: no  History of violence: no  Depression screening was performed with standardized tool: Yes - Depression    Surgical History  He has no past surgical history on file.     Social History  He reports that he has never smoked. He has never used smokeless tobacco. " "He reports that he does not currently use alcohol. He reports that he does not use drugs.    Current living situation: Patient living with wife in [place].   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 Otherwise denies close family relationships.   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    Review of Systems    Psychiatric ROS - Adult  Anxiety: General Anxiety Disorder (LUIS ALBERTO)LUIS ALBERTO Behaviors: difficult to control worry, excessive anxiety/worry, difficulty concentrating, and sleep disturbance  Depression: concentration, sleep decreased , suicidal thoughts, and tearfulness  Delirium: negative  Psychosis: negative  Maye: negative  Safety Issues: suicidal ideation    Physical Exam        Mental Status Exam  Mental Status Exam:  General/Appearance: Mild Psychological Distress, appears stated age, in hospital gown, sitting on bed, body habitus: overweight, tall, grooming/hygiene is good, combed hair, trimmed fingernails  Attitude/Behavior: engages in interview, cooperative, open, appropriate eye contact, calm  Motor Activity: no psychomotor agitation/retardation, gait: stable  Mood: \"anxious\", \"depressed\"  Affect: Quality-mood congruent, anxious, Intensity-normal, Range-full  Speech: conversational rate, volume, prosody, syntax, tone  Thought Process: linear  Thought Content: denies " SI/HI, ongoing minimization concern, is discharge focused/preoccupied.  Delusional thinking: none elicited  Thought Perception: denies AVH  Cognition: alert & oriented x 4, no deficits in attention/concentration noted, good fund of knowledge, recent and remote memory intact  Insight: poor, as evidenced by limited appreciation for the severity of the presentation, the recommendation by multiple physicians to proceed to this level of care, the failure to spontaneously appreciate the value of a brief hospital stay to stabilize and coordinate a safe discharge plan  Judgment: Fair evidenced by cooperation on unit with assessments, agreeability to take medications by giving consent, making needs known, less than fair evidenced by discharge preoccupation and efforts to avoid treatment by being discharged to the community lacking as of yet a safe discharge plan.    Psychiatric Risk Assessment  Violence Risk Assessment: male  Acute Risk of Harm to Others is Considered: low   Suicide Risk Assessment: current psychiatric illness, feelings of hopelessness, life crisis (shame/despair), male, severe anxiety, suicidal ideations, and other surgery resident in first quarter of first year of residency training  Protective Factors against Suicide: adherence to  treatment, employment, marriage/partnership, positive family relationships, and sense of responsibility toward family  Acute Risk of Harm to Self is Considered: moderate    Last Recorded Vitals  Blood pressure 130/86, pulse 87, temperature 37.1 °C (98.8 °F), temperature source Temporal, resp. rate 17, height 1.829 m (6'), weight 131 kg (288 lb 8 oz), SpO2 99%.    Relevant Results      Results for orders placed or performed during the hospital encounter of 06/27/25 (from the past 96 hours)   POCT GLUCOSE   Result Value Ref Range    POCT Glucose 120 (H) 74 - 99 mg/dL         OARRS: Reviewed.      Assessment/Plan   Assessment & Plan  Adjustment disorder with mixed anxiety and  depressed mood    Adjustment disorder      Bradleysakina Peterpenny Mathew Mcguire is a 25 y.o. male with a history of anxiety and prediabetes presenting with suicidal ideations secondary to occupational demands.  Patient unconvincingly denies suicidal ideation upon assessment, however does endorse recent significant decompensation of mood involving cyclical episodes of severe anxiety and depressive symptoms, feeling overwhelmed, and inability to function as a result.  As such, patient is currently thought to be experiencing a deviation of mood from baseline which significantly impairs his ability to meet the ordinary demands of life.  As such, patient requires treatment for safety, stabilization, and monitoring.  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  Patient has several risk factors for suicide including being a resident's surgical resident, and having recently endorsed suicidal ideations with plan, a history of suicidality, lack of linkage to providers, lacking as of yet supportive collateral, failing treatment with multiple medications due to side effects.  Therefore, he would greatly benefit from hospitalization for stabilization of decompensated month.  During encounter, minimizing presentation and benefit of inpatient stay with discharge request on the day of admission.  Patient reporting several dissatisfactions related to the admission in terms of procedure/protocol.  We discussed with patient concern for suicidality with hospitalization to keep safe on making medication adjustments and coordinate a safe home-going discharge plan.  He has consented to a plan to start mirtazapine this evening for targeting of mood and sleep as patient has been experiencing deviation in both domains.  Will continue to evaluate patient on daily basis for need for further psychopharmacologic changes and  targeting.     Impression  # Unspecified mood disorder  - Rule out adjustment disorder versus major depressive disorder    Biological -      We will start Mirtazapine 7.5mg PO at bedtime 6/27 with plan to increase to 15 mg p.o. at bedtime tomorrow 6/28, for mood and sleep    Discontinued escitalopram 10 mg p.o. daily given reported dissatisfaction with sexual side effect profile and lower efficacy compared to previous trial of 20 mg p.o. daily    Discontinued bupropion 75 mg p.o. daily given history of vasovagal reaction in addition to subtherapeutic dosage thought not likely to contribute to clinical improvement prior to presentation    Medical to please co-manage pertinences.      Discussion with patient regarding risk benefits and alternatives and side effects with opportunity to ask questions and questions answered.  Patient given consent to the plan as noted     2. Psychological -        Patient is encouraged to participate with the therapeutic milieu and program and group therapy        3. Sociological -       Patient encouraged to cooperate with social work staff on issues relevant to discharge planning      Goals for discharge include:  Psychiatric condition: to lower acute risk, return to baseline level of functioning, work to identify positive coping skills to manage psychiatric symptoms, allow for reconciliation of medications  Physical condition: increase daily physical activity, demonstrate interest in completing daily activity, and complete Activities of Daily Living  Social function: identify protective factors and family supports, increase social interactions on the unit with peers and providing staff, and demonstrate appropriate problem solving skills for engaging after care on discharge        I personally spent 75 minutes today providing care for this patient, including preparation, face to face time, documentation and other services such as review of medical records, diagnostic result, patient  education, counseling, coordination of care as specified in the encounter.     Parts of this chart have been completed using voice recognition software.  Please excuse any errors of transcription.  Despite the medical decision making time stamp, my medical decision making has taken place during the patient's entire visit.  Thought process and reason for plan has been formulated from the time that I saw the patient until the time of disposition and is not specific to one specific moment during their visit and furthermore the medical decision making encompasses the entire chart and not only that represented in this note.     Nutrition/Malnutrition:  The diagnosis of malnutrition has significant impact on risk adjustment, mortality and readmission rates, length of stay and hospital reimbursement.  The below is a representation of nutrition status if evaluated.  If blank, there is no associated malnutrition finding to incorporate per the dietician team:    Medication Consent  Medication Consent: risks, benefits, side effects reviewed for all ordered meds specifically, for Remeron, we discussed potential for weight gain and needing to carefully monitor this related to his prediabetic state.  We discussed Remeron s having fewer sexual side effects while also being able to help with depressive and anxious features.  We discussed sedation as a side effect which is why this medication is given at night and we discussed that as this medication dose increases typically above 15 mg that sedation is expected to decrease as the norepinephrine component becomes more substantial.    Discussed treatment, care, and plan with Dr. Yates who agrees with stated above.     Samuel Allan MD  PGY2 Psychiatry

## 2025-06-27 NOTE — NURSING NOTE
Nurse Admission Note    Reason for admission in patient's own words:  Increase in anxiety and depression and was having suicidal thoughts.     Patient living arrangements prior to admission:  Patient lives with his wife.     Legal status:  voluntarily    Medical consult notification to:  Dr. Rangel    Admission folder given to:   Patient     Problems/treatment plans:  SI  Anxiety  Depression    Patient requests family to be notified of admission?    Yes  Person notified:  Called patient's wife (ANGUS signed); left voicemail to return call.     Strengths:  optimistic, motivated and ready for change, attempting to realize potential, has interpersonal relationships and support, housing stability, steady employment, financial stability, knowledge of medications, willing to follow established treatment plan, and good communication skills    Liabilities:  Unknown     Previous mental health treatment:  Unknown     Preliminary discharge planning needs:  Unknown

## 2025-06-27 NOTE — LETTER
July 1, 2025     Patient: Bradley Mcguire   YOB: 2000           To Whom It May Concern:    Bradley Mcguire was hospitalized impacting dates June 25, 2025 to today. Please excuse Bradley Wheatley for his absence from work related to the admission.  [facility] paperwork is soon expected to help determine his return to work date.    If you have any questions or concerns, please don't hesitate to call.         Sincerely,         Johny Yates, DO

## 2025-06-27 NOTE — CONSULTS
[facility]: [facility]  PROGRESS NOTE      Bradley Whitten Kalee Mcguire is a 25 y.o. male that is being seen  today for management of [facility]..  Subjective   Patient is a 25-year-old male with a history of prediabetes on metformin, hypothyroidism, morbid obesity, hyperlipidemia and depression with anxiety who is being seen for medical management at [facility].  Patient was admitted to the ER with suicidal ideations and increased anxiety.  Patient was medically cleared for admission to [facility].  Patient has prediabetes and is on metformin.  Patient's thyroid functions have been stable and is on levothyroxine.  Patient is also on cholesterol medications.  Denies any significant medical issues at present.  Patient is being seen by [facility] team.      ROS  Negative for fever or chills  Negative for sore throat, ear pain, nasal discharge  Negative for cough, shortness of breath or wheezing  Negative for chest pain, palpitations, swelling of legs  Negative for abdominal pain, constipation, diarrhea, blood in the stools  Negative for urinary complaints  Negative for headache, dizziness, weakness or numbness  Negative for joint pain  Positive for depression and anxiety  All other systems reviewed and were negative   Vitals:    06/27/25 0945   BP: 130/86   Pulse: 87   Resp: 17   Temp: 37.1 °C (98.8 °F)   SpO2: 99%      Vitals:    06/27/25 0945   Weight: 131 kg (288 lb 8 oz)     Body mass index is 39.13 kg/m².  Physical Exam  Constitutional: Patient does not appear to be in any acute distress  Head and Face: NCAT  Eyes: Normal external exam, EOMI  ENT: Normal external inspection of ears and nose. Oropharynx normal.  Cardiovascular: RRR, S1/S2, no murmurs, rubs, or gallops, radial pulses +2, no edema of extremities  Pulmonary: CTAB, no respiratory distress.  Abdomen: +BS, soft, non-tender, nondistended, no guarding or rebound, no masses noted  MSK: No joint swelling, normal  movements of all extremities. Range of motion- normal.  Skin- No lesions, contusions, or erythema.  Peripheral puslses palpable bilaterally 2+  Neuro: AAO X3, Cranial nerves 2-12 grossly intact,DTR 2+ in all 4 limbs   Psychiatric: Judgment intact. Appropriate mood and behavior    LABS   [unfilled]  Lab Results   Component Value Date    GLUCOSE 107 (H) 06/25/2025    CALCIUM 10.1 06/25/2025     06/25/2025    K 4.0 06/25/2025    CO2 22 06/25/2025     06/25/2025    BUN 13 06/25/2025    CREATININE 0.90 06/25/2025     Lab Results   Component Value Date    ALT 53 (H) 06/25/2025    AST 25 06/25/2025    ALKPHOS 83 06/25/2025    BILITOT 0.5 06/25/2025     Lab Results   Component Value Date    WBC 8.4 06/25/2025    HGB 15.7 06/25/2025    HCT 44.9 06/25/2025    MCV 82 06/25/2025     06/25/2025     Lab Results   Component Value Date    CHOL 174 01/15/2024    CHOL 133 04/21/2023     Lab Results   Component Value Date    HDL 31.0 01/15/2024    HDL 32.5 (A) 04/21/2023     Lab Results   Component Value Date    LDLCALC 73 01/15/2024     Lab Results   Component Value Date    TRIG 349 (H) 01/15/2024    TRIG 114 04/21/2023     Lab Results   Component Value Date    HGBA1C 5.9 (H) 05/21/2025     Other labs not included in the list above were reviewed either before or during this encounter.    History    Medical History[1]  Surgical History[2]  Family History[3]  Allergies[4]  Medications Ordered Prior to Encounter[5]  Immunization History   Administered Date(s) Administered    Influenza, Unspecified 10/25/2023    Moderna COVID-19 vaccine, 12 years and older (50mcg/0.5mL)(Spikevax) 11/07/2023     Patient's medical history was reviewed and updated either before or during this encounter.  ASSESSMENT / PLAN:  Active Hospital Problems    *Adjustment disorder with mixed anxiety and depressed mood      Dyslipidemia      LUIS ALBERTO (generalized anxiety disorder)      Class 3 severe obesity due to excess calories without serious  comorbidity with body mass index (BMI) of 40.0 to 44.9 in adult      Acquired hypothyroidism      Pre-diabetes       Patient is being seen for medical management of geropsych.  Patient has generalized anxiety disorder with depression and suicidal ideations and is being seen by psych geropsych team.  Patient has prediabetes and is on metformin.  Patient also has hypothyroidism and is on levothyroxine as well as patient has hyperlipidemia and is on statins.  All other lab work done in the ER reviewed and has been stable.  Patient's recent hemoglobin A1c is 5.9.    Jorge Rangel MD        [1] History reviewed. No pertinent past medical history.  [2] History reviewed. No pertinent surgical history.  [3] No family history on file.  [4] No Known Allergies  [5]   Current Facility-Administered Medications on File Prior to Encounter   Medication Dose Route Frequency Provider Last Rate Last Admin    [DISCONTINUED] escitalopram (Lexapro) tablet 10 mg  10 mg oral Daily Thomas Philippe,    10 mg at 06/27/25 0819     Current Outpatient Medications on File Prior to Encounter   Medication Sig Dispense Refill    buPROPion (Wellbutrin) 75 mg tablet Take 1 tablet (75 mg) by mouth 2 times a day. (Patient taking differently: Take 1 tablet (75 mg) by mouth once daily.) 60 tablet 3    escitalopram (Lexapro) 20 mg tablet Take 1 tablet (20 mg) by mouth once daily.      levothyroxine (Synthroid, Levoxyl) 75 mcg tablet Take 1 tablet (75 mcg) by mouth once daily. 90 tablet 1    metFORMIN XR (Glucophage-XR) 500 mg 24 hr tablet Take 2 tablets (1,000 mg) by mouth once daily in the evening. Take with meals. 180 tablet 3    simvastatin (Zocor) 20 mg tablet Take 1 tablet (20 mg) by mouth once daily. 90 tablet 3    [DISCONTINUED] tirzepatide, weight loss, (Zepbound) 2.5 mg/0.5 mL injection Inject 2.5 mg under the skin every 7 days. 2 mL 0    [DISCONTINUED] tirzepatide, weight loss, (Zepbound) 5 mg/0.5 mL injection Inject 5 mg under the skin  every 7 days. Do not fill before July 2, 2025. 2 mL 2

## 2025-06-27 NOTE — PROGRESS NOTES
" REHAB Therapy Assessment & Treatment    Patient Name: Bradley Mcguire  MRN: [id number]  Today's Date: 6/27/2025    Recreation Therapy assessment completed on this date: info gathered via pt interview in paul privately and EMR.     Activity Assessment:  Initial Assessment  Attention Span: 15-30 Miutes  Cognitive Behavior Status/Orientation: Person, Place, Time, Attentive  Crisis Triggers: Emotions, Mood, Work  Emotional Concerns/Mood/Affect: Alert, Calm, Cooperative, Friendly  Hearing: Adequate  Memory: Memory intact  Motivation Level: No encouragement needed  Negative Coping Skills:  (SI)  Speech/Communication/Socialization: Verbal, Responds when approached, Initiatives conversattion with others  Vision: Glasses    Leisure Survey:  [facility]  Activity Preference: Group, 1:1, Independent  Activity Tolerance: Good 30-60 minutes  At Home ADL Deficits:  (no deficits noted)  Barriers to Leisure Participation: Lack of time  Community Resources: Active in community groups (aware)  Creative Activities:  (does not identify)  Education/School: medical school  Following Directions: Able to follow multi-step commands  Leisure Interests: Actively participates in leisure interests  Living Arrangement: Spouse  Motivators for Recreation/Leisure Involvement: Sense of well being/contentment, Intellectual expression, Fun/entertainment, Self-esteem/sense of accomplishment, Social interaction, Physical benefits, Relaxation  Outdoor Activities: Trips/traveling  Patient/Family Education Needs: safety awareness,leisure awareness, community safety  Patient Strengths: a&ox3, ambulaoty, cooperative, support, stable housing  Patient Weakness: depressed mood, anxiety,  Physial Activity:  (walking dog)  Social/Group Activities:  (spending time with friends)  Solitary Activities: Watch/listen television, Reading, Music, Family oriented  Special Hobbies: \"road trips\"  Spectator Events: " Movies  Transportation: Drives  Work/Volunteer: physician  Additional Comments: Pt is alert and oriented x2-3  poor insight/judgement.  Pt is a 25-year-old male. Pt admitted for anxiety, depressed mood, SI. Pt denies SI at this time. Calm, cooperative and friendly.  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 Pt currently having difficulty coping with stressors of residency program. Pt is able to identify leisure interests such as reading, dog, movies/television, road trips and spending time with wife.” Pt educated on recreation Therapy and group/milieu participation. CTRS will encourage pt to attend groups of choice daily.          Encounter Problems       Encounter Problems (Active)       Distress Tolerance BH RT       To learn ways to manage stress       Start:  06/27/25    Expected End:  07/11/25               Participation in Life Roles       Client will identify 1-3 positive ways to re-engage in meaningful life roles after discharge prior to discharge       Start:  06/27/25    Expected End:  07/11/25               Recreation BH RT       Awareness       Start:  06/27/25    Expected End:  07/11/25               Social       Stimulation       Start:  06/27/25    Expected End:  07/11/25

## 2025-06-28 LAB
CHOLEST SERPL-MCNC: 127 MG/DL (ref 0–199)
CHOLESTEROL/HDL RATIO: 4.3
GLUCOSE BLD MANUAL STRIP-MCNC: 115 MG/DL (ref 74–99)
GLUCOSE BLD MANUAL STRIP-MCNC: 92 MG/DL (ref 74–99)
HDLC SERPL-MCNC: 29.5 MG/DL
LDLC SERPL CALC-MCNC: 79 MG/DL
NON HDL CHOLESTEROL: 98 MG/DL (ref 0–149)
TRIGL SERPL-MCNC: 94 MG/DL (ref 0–149)
VLDL: 19 MG/DL (ref 0–40)

## 2025-06-28 PROCEDURE — 2500000001 HC RX 250 WO HCPCS SELF ADMINISTERED DRUGS (ALT 637 FOR MEDICARE OP): Performed by: INTERNAL MEDICINE

## 2025-06-28 PROCEDURE — 82947 ASSAY GLUCOSE BLOOD QUANT: CPT

## 2025-06-28 PROCEDURE — 1140000001 HC PRIVATE PSYCH ROOM DAILY

## 2025-06-28 PROCEDURE — 36415 COLL VENOUS BLD VENIPUNCTURE: CPT | Performed by: PSYCHIATRY & NEUROLOGY

## 2025-06-28 PROCEDURE — 99232 SBSQ HOSP IP/OBS MODERATE 35: CPT | Performed by: STUDENT IN AN ORGANIZED HEALTH CARE EDUCATION/TRAINING PROGRAM

## 2025-06-28 PROCEDURE — 2500000001 HC RX 250 WO HCPCS SELF ADMINISTERED DRUGS (ALT 637 FOR MEDICARE OP)

## 2025-06-28 PROCEDURE — 80061 LIPID PANEL: CPT | Performed by: PSYCHIATRY & NEUROLOGY

## 2025-06-28 PROCEDURE — 2500000002 HC RX 250 W HCPCS SELF ADMINISTERED DRUGS (ALT 637 FOR MEDICARE OP, ALT 636 FOR OP/ED): Performed by: INTERNAL MEDICINE

## 2025-06-28 RX ADMIN — METFORMIN ER 500 MG 1000 MG: 500 TABLET ORAL at 17:12

## 2025-06-28 RX ADMIN — LEVOTHYROXINE SODIUM 75 MCG: 0.07 TABLET ORAL at 09:56

## 2025-06-28 RX ADMIN — SIMVASTATIN 20 MG: 20 TABLET, FILM COATED ORAL at 20:26

## 2025-06-28 RX ADMIN — MIRTAZAPINE 15 MG: 15 TABLET, FILM COATED ORAL at 20:27

## 2025-06-28 ASSESSMENT — PAIN SCALES - GENERAL: PAINLEVEL_OUTOF10: 0 - NO PAIN

## 2025-06-28 NOTE — CARE PLAN
The patient's goals for the shift include shower    The clinical goals for the shift include maintain safety    Over the shift, the patient did not make progress toward the following goals. Barriers to progression include none. Recommendations to address these barriers include continue current care plan.

## 2025-06-28 NOTE — CARE PLAN
The patient's goals for the shift include Talk with the doctor    The clinical goals for the shift include maintain safety/rest      Problem: Safety - Adult  Goal: Free from fall injury  Outcome: Progressing     Problem: Discharge Planning  Goal: Discharge to home or other facility with appropriate resources  Outcome: Progressing     Problem: Chronic Conditions and Co-morbidities  Goal: Patient's chronic conditions and co-morbidity symptoms are monitored and maintained or improved  Outcome: Progressing     Problem: Potential for Harm to Self or Others  Goal: Participates in unit activities  Outcome: Progressing     Problem: Anxiety  Goal: Implements measures to reduce anxiety  Outcome: Progressing

## 2025-06-28 NOTE — GROUP NOTE
Group Topic: Self-Care/Wellness   Group Date: 6/28/2025  Start Time: 2000  End Time: 2030  Facilitators: Bolivar Gutierrez   Department: [facility]    Number of Participants: 12   Group Focus: check in  Treatment Modality: Individual Therapy  Interventions utilized were support  Purpose: self-care    Name: Bradley Mcguire YOB: 2000   MR: [id number]      Facilitator: [facility]  Level of Participation: active  Quality of Participation: appropriate/pleasant  Interactions with others: appropriate  Mood/Affect: positive  Triggers (if applicable): n\a  Cognition: coherent/clear  Progress: Moderate  Comments: pt problem is adjustment disorder.   Plan: continue with services

## 2025-06-28 NOTE — GROUP NOTE
"Group Topic: Symptom Management   Group Date: 6/28/2025  Start Time: 0900  End Time: 0950  Facilitators: MIKE Miranda   Department: [facility]    Number of Participants: 7   Group Focus: activities of daily living skills, communication, concentration, coping skills, feeling awareness/expression, personal responsibility, safety plan, and self-awareness  Treatment Modality: Recreational Therapy  Interventions utilized were assignment, patient education, problem solving, and support  Purpose: coping skills, insight or knowledge, self-care, and relapse prevention strategies    Name: Bradley Mcguire YOB: 2000   MR: [id number]      Facilitator: Recreational Therapist  Level of Participation: did not attend  Progress: None  Comments: Patients were provided with the \"Mental Health Maintenance Plan\" handout. We discussed five main areas including (triggers, warning signs, self-care practices, coping strategies, and navigating the path back to well-being). Through open dialogue and collaborative exploration, we discussed a path back to well-being and patients were prompted to record personal responses in each area.   Patient declined invitation to group activity at this time. Patient will continue to be provided with opportunities to enhance leisure skills and/or coping mechanisms.  Plan: continue with services      "

## 2025-06-28 NOTE — GROUP NOTE
Group Topic: Goals   Group Date: 6/28/2025  Start Time: 0730  End Time: 0800  Facilitators: Efraín Calero   Department: [facility]     Number of Participants: 6   Group Focus: goals  Treatment Modality: Patient-Centered Therapy  Interventions utilized were assignment  Purpose: coping skills and self-care    Name: Bradley Mcguire YOB: 2000   MR: [id number]      Facilitator: Mental Health [facility]  Level of Participation: moderate  Quality of Participation: appropriate/pleasant  Interactions with others: appropriate  Mood/Affect: appropriate  Triggers (if applicable): n/a  Cognition: coherent/clear  Progress: Moderate  Comments: pt attended group and worked on assignment  Plan: continue with services

## 2025-06-28 NOTE — GROUP NOTE
Group Topic: Cognitive Focus   Group Date: 6/28/2025  Start Time: 1030  End Time: 1120  Facilitators: NESS MirandaS   Department: [facility]    Number of Participants: 7   Group Focus: coping skills, leisure skills, and social skills  Treatment Modality: Recreation Therapy  Interventions utilized were leisure development and problem solving  Purpose: coping skills and communication skills    Name: Bradley Mcguire YOB: 2000   MR: [id number]      Facilitator: Recreational Therapist  Level of Participation: active  Quality of Participation: appropriate/pleasant and cooperative  Interactions with others: appropriate  Mood/Affect: appropriate  Triggers (if applicable): n/a  Cognition: coherent/clear  Progress: Moderate  Comments: Patients engaged in a socially enriching experience of Mr. Agarwal, designed to foster cognitive skills and camaraderie. Patients are presented with thought provoking topics which encourage strategic thinking and collaborative engagement. Patients were able to showcase their knowledge, creativity, and teamwork in a supportive environment.   Pt pleasant, cooperative, engaging, and participative.   Plan: continue with services

## 2025-06-28 NOTE — PROGRESS NOTES
Bradleysakina Wheatley Mathew Mcguire is a 25 y.o. male on day 1 of admission presenting with Adjustment disorder with mixed anxiety and depressed mood.    Subjective   ***       Objective     Physical Exam    Last Recorded Vitals  Blood pressure 114/68, pulse 76, temperature 36.8 °C (98.2 °F), temperature source Temporal, resp. rate 17, height 1.829 m (6'), weight 131 kg (288 lb 8 oz), SpO2 100%.  Intake/Output last 3 Shifts:  No intake/output data recorded.    Relevant Results  {If you would like to pull in Medications, type .meds     If you would like to pull in Lab results for the last 24 hours, type .[id number]    If you would like to pull in Imaging results, type .[id number] :99}    {Link to Stroke Scoring tools - Link :99}                        Assessment & Plan  Adjustment disorder with mixed anxiety and depressed mood    Acquired hypothyroidism    Pre-diabetes    Class 3 severe obesity due to excess calories without serious comorbidity with body mass index (BMI) of 40.0 to 44.9 in adult    Dyslipidemia    LUIS ALBERTO (generalized anxiety disorder)    ***    {This patient does not have an ACP note on file for this encounter, please fill one out - Advance Care Planning Activity :99}  I spent *** minutes in the professional and overall care of this patient.      Christian Dinh MD     recorded.    Relevant Results                              Assessment & Plan  Adjustment disorder with mixed anxiety and depressed mood    Acquired hypothyroidism    Pre-diabetes    Class 3 severe obesity due to excess calories without serious comorbidity with body mass index (BMI) of 40.0 to 44.9 in adult    Dyslipidemia    LUIS ALBERTO (generalized anxiety disorder)    Bradley Carmona Fish Mcguire is a 25 y.o. male with a history of anxiety and prediabetes presenting with suicidal ideations secondary to occupational demands.  Patient unconvincingly denies suicidal ideation upon assessment, however does endorse recent significant decompensation of mood involving cyclical episodes of severe anxiety and depressive symptoms, feeling overwhelmed, and inability to function as a result.  As such, patient is currently thought to be experiencing a deviation of mood from baseline which significantly impairs his ability to meet the ordinary demands of life.  As such, patient requires treatment for safety, stabilization, and monitoring.  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  Patient has several risk factors for suicide including being a resident's surgical resident, and having recently endorsed suicidal ideations with plan, a history of suicidality, lack of linkage to providers, lacking as of yet supportive collateral, failing treatment with multiple medications due to side effects.  Therefore, he would greatly benefit from hospitalization for stabilization of decompensated month.  During encounter, minimizing presentation and benefit of inpatient stay with discharge request on the day of admission.  Patient reporting several dissatisfactions related to the admission in terms of procedure/protocol.  We discussed with patient concern for suicidality with hospitalization to keep safe on making medication  adjustments and coordinate a safe home-going discharge plan.  He has consented to a plan to start mirtazapine this evening for targeting of mood and sleep as patient has been experiencing deviation in both domains.  Will continue to evaluate patient on daily basis for need for further psychopharmacologic changes and targeting.  Impression  # Unspecified mood disorder  - Rule out adjustment disorder versus major depressive disorder     Biological -      Increase mirtazapine to 15 mg p.o. at bedtime for mood and sleep     Discontinued escitalopram 10 mg p.o. daily given reported dissatisfaction with sexual side effect profile and lower efficacy compared to previous trial of 20 mg p.o. daily     Discontinued bupropion 75 mg p.o. daily given history of vasovagal reaction in addition to subtherapeutic dosage thought not likely to contribute to clinical improvement prior to presentation     Medical to please co-manage pertinences.      Discussion with patient regarding risk benefits and alternatives and side effects with opportunity to ask questions and questions answered.  Patient given consent to the plan as noted     2. Psychological -        Patient is encouraged to participate with the therapeutic milieu and program and group therapy        3. Sociological -       Patient encouraged to cooperate with social work staff on issues relevant to discharge planning      Goals for discharge include:  Psychiatric condition: to lower acute risk, return to baseline level of functioning, work to identify positive coping skills to manage psychiatric symptoms, allow for reconciliation of medications  Physical condition: increase daily physical activity, demonstrate interest in completing daily activity, and complete Activities of Daily Living  Social function: identify protective factors and family supports, increase social interactions on the unit with peers and providing staff, and demonstrate appropriate problem solving skills  for engaging after care on discharge        Parts of this chart have been completed using voice recognition software.  Please excuse any errors of transcription.  Despite the medical decision making time stamp, my medical decision making has taken place during the patient's entire visit.  Thought process and reason for plan has been formulated from the time that I saw the patient until the time of disposition and is not specific to one specific moment during their visit and furthermore the medical decision making encompasses the entire chart and not only that represented in this note.     Nutrition/Malnutrition:  The diagnosis of malnutrition has significant impact on risk adjustment, mortality and readmission rates, length of stay and hospital reimbursement.  The below is a representation of nutrition status if evaluated.  If blank, there is no associated malnutrition finding to incorporate per the dietician team:     Medication Consent  Medication Consent: risks, benefits, side effects reviewed for all ordered meds specifically, for Remeron, we discussed potential for weight gain and needing to carefully monitor this related to his prediabetic state.  We discussed Remeron s having fewer sexual side effects while also being able to help with depressive and anxious features.  We discussed sedation as a side effect which is why this medication is given at night and we discussed that as this medication dose increases typically above 15 mg that sedation is expected to decrease as the norepinephrine component becomes more substantial.        I spent 20 minutes in the professional and overall care of this patient.      Christian Dinh MD

## 2025-06-28 NOTE — GROUP NOTE
Group Topic: Stress Reduction/Relaxation   Group Date: 6/28/2025  Start Time: 1515  End Time: 1615  Facilitators: Giovanny eWbb   Department: [facility]    Number of Participants: 6   Group Focus: Audio Visual Group  Treatment Modality:  Audio Visual group   Interventions utilized were leisure development  Purpose: other: Relaxation    Name: Bradleysakina Peterpenny Murosolomon Mcguire YOB: 2000   MR: [id number]      Facilitator: Mental Health [facility]  Level of Participation: did not attend  Quality of Participation: did not attend  Interactions with others: did not attend  Mood/Affect: did not attend  Triggers (if applicable): did not attend  Cognition: did not attend  Progress: Otherdid not attend  Comments: did not attend  Plan: continue with services

## 2025-06-29 VITALS
WEIGHT: 288.5 LBS | TEMPERATURE: 98.2 F | HEART RATE: 87 BPM | OXYGEN SATURATION: 98 % | DIASTOLIC BLOOD PRESSURE: 74 MMHG | SYSTOLIC BLOOD PRESSURE: 124 MMHG | BODY MASS INDEX: 39.08 KG/M2 | HEIGHT: 72 IN | RESPIRATION RATE: 16 BRPM

## 2025-06-29 LAB
GLUCOSE BLD MANUAL STRIP-MCNC: 105 MG/DL (ref 74–99)
GLUCOSE BLD MANUAL STRIP-MCNC: 130 MG/DL (ref 74–99)

## 2025-06-29 PROCEDURE — 2500000001 HC RX 250 WO HCPCS SELF ADMINISTERED DRUGS (ALT 637 FOR MEDICARE OP)

## 2025-06-29 PROCEDURE — 2500000001 HC RX 250 WO HCPCS SELF ADMINISTERED DRUGS (ALT 637 FOR MEDICARE OP): Performed by: INTERNAL MEDICINE

## 2025-06-29 PROCEDURE — 1140000001 HC PRIVATE PSYCH ROOM DAILY

## 2025-06-29 PROCEDURE — 2500000002 HC RX 250 W HCPCS SELF ADMINISTERED DRUGS (ALT 637 FOR MEDICARE OP, ALT 636 FOR OP/ED): Performed by: INTERNAL MEDICINE

## 2025-06-29 PROCEDURE — 82947 ASSAY GLUCOSE BLOOD QUANT: CPT

## 2025-06-29 RX ORDER — PANTOPRAZOLE SODIUM 20 MG/1
20 TABLET, DELAYED RELEASE ORAL DAILY PRN
Status: DISCONTINUED | OUTPATIENT
Start: 2025-06-29 | End: 2025-07-01 | Stop reason: HOSPADM

## 2025-06-29 RX ADMIN — SIMVASTATIN 20 MG: 20 TABLET, FILM COATED ORAL at 21:07

## 2025-06-29 RX ADMIN — METFORMIN ER 500 MG 1000 MG: 500 TABLET ORAL at 17:21

## 2025-06-29 RX ADMIN — MIRTAZAPINE 15 MG: 15 TABLET, FILM COATED ORAL at 21:07

## 2025-06-29 RX ADMIN — LEVOTHYROXINE SODIUM 75 MCG: 0.07 TABLET ORAL at 06:10

## 2025-06-29 ASSESSMENT — PAIN SCALES - GENERAL
PAINLEVEL_OUTOF10: 0 - NO PAIN
PAINLEVEL_OUTOF10: 0 - NO PAIN

## 2025-06-29 ASSESSMENT — PAIN - FUNCTIONAL ASSESSMENT: PAIN_FUNCTIONAL_ASSESSMENT: 0-10

## 2025-06-29 NOTE — CARE PLAN
The patient's goals for the shift include shower    The clinical goals for the shift include maintain safety/rest      Problem: Safety - Adult  Goal: Free from fall injury  Outcome: Progressing     Problem: Discharge Planning  Goal: Discharge to home or other facility with appropriate resources  Outcome: Progressing     Problem: Potential for Harm to Self or Others  Goal: Participates in unit activities  Outcome: Progressing     Problem: Alteration in Sleep  Goal: STG - Reports nightly sleep, duration, and quality  Outcome: Progressing     Problem: Self Care Deficit  Goal: Increase group attendance  Outcome: Progressing

## 2025-06-29 NOTE — GROUP NOTE
"Group Topic: Self Esteem   Group Date: 6/29/2025  Start Time: 1000  End Time: 1100  Facilitators: Megan RANKIN CTRS   Department: [facility] REHABTH VIRTUAL    Number of Participants: 9   Group Focus: self-awareness and self-esteem  Treatment Modality: Leisure Development and Other: education, Recreation Therapy  Interventions utilized were exploration, leisure development, reminiscence, story telling, and support, humor  Purpose: Patients engaged in group activity completing \"Self Esteem Mad Libs\". This group promotes self-reflection and self-expression while fostering a positive self-image. Pt engaged in group discussion covering multi aspects of self-esteem and then participated in creating a Mad Rekha story utilizing appropriate humor to foster enhanced mood/laughter, increase socialization. This activity also aims to enhance focus and following directions by use of unique storytelling.?           Name: Bradley Mcguire YOB: 2000   MR: [id number]      Facilitator: Recreational Therapist  Level of Participation: moderate  Quality of Participation: appropriate/pleasant, attentive, cooperative, and engaged  Interactions with others: appropriate, quiet  Mood/Affect: appropriate  Cognition: quiet  Progress: Moderate  Comments: pt problem is depressed mood. Does not engage/share in group discussion but is observed often nodding head and following along. Quiet throughout but appears to completed worksheet/activity appropriately.   Plan: continue with services      "

## 2025-06-29 NOTE — CARE PLAN
The patient's goals for the shift include attend groups    The clinical goals for the shift include maintain safety    Over the shift, the patient did not make progress toward the following goals. Barriers to progression include pt and spouse concerned about potential sexual side effects of new medications. Recommendations to address these barriers include speak with provider about concerns, this nurse advised provider of pt concerns as well.

## 2025-06-29 NOTE — PROGRESS NOTES
"Bradley Wheatley Mathew Mcguire is a 25 y.o. male on day 2 of admission presenting with Adjustment disorder with mixed anxiety and depressed mood.      Subjective   ***       Objective     Last Recorded Vitals  Blood pressure 138/86, pulse 72, temperature 37 °C (98.6 °F), temperature source Temporal, resp. rate 16, height 1.829 m (6'), weight 131 kg (288 lb 8 oz), SpO2 98%.    Sleep Log  Estimated \"Sleeping\" Durations   Night Est. Hours   06/26 0.00   06/27 11.00-11.75   06/28 9.50        Review of Systems    Psychiatric ROS - Adult  Anxiety: {Anxiety:91182188}  Depression: {Depression:96134534}  Delirium: {Delirium:43865971}  Psychosis: {Psychosis:14815843}  Maye: {Maye:52534236}  Safety Issues: {Safety Issues:33238032}  Psychiatric ROS Comment: ***    Physical Exam      Mental Status Exam  General: ***  Appearance: ***  Attitude: ***  Behavior: ***  Motor Activity: ***  Speech: ***  Mood: ***  Affect: ***  Thought Process: ***  Thought Content: ***  Thought Perception: ***  Cognition: ***  Insight: ***  Judgement: ***    Psychiatric Risk Assessment  Violence Risk Assessment: {Violence Risk Assessment:73965}  Acute Risk of Harm to Others is Considered: {Low/ModHigh:35516}   Suicide Risk Assessment: {Suicide Risk Assessment:51875}  Protective Factors against Suicide: {Protective Factors:09007}  Acute Risk of Harm to Self is Considered: {Low/ModHigh:72520}    Relevant Results  {If you would like to pull in Medications, type .meds     If you would like to pull in Lab results for the last 24 hours, type .[id number]    If you would like to pull in Imaging results, type .imgrslt :99}      {Link to Stroke Scoring tools - Link :99}       Assessment & Plan  Adjustment disorder with mixed anxiety and depressed mood    Acquired hypothyroidism    Pre-diabetes    Class 3 severe obesity due to excess calories without serious comorbidity with body mass index (BMI) of 40.0 to 44.9 in adult    Dyslipidemia    LUIS ALBERTO " (generalized anxiety disorder)    ***           {This patient does not have an ACP note on file for this encounter, please fill one out - Advance Care Planning Activity :99}    I spent *** minutes in the professional and overall care of this patient.      Christian Dinh MD

## 2025-06-29 NOTE — GROUP NOTE
Group Topic: Goals   Group Date: 6/29/2025  Start Time: 0730  End Time: 0800  Facilitators: Efraín Calero   Department: [facility] Geriatric     Number of Participants: 7   Group Focus: goals  Treatment Modality: Patient-Centered Therapy  Interventions utilized were assignment  Purpose: self-care    Name: Bradley Mcguire YOB: 2000   MR: [id number]      Facilitator: Mental [facility]  Level of Participation: did not attend  Quality of Participation: did not attend  Interactions with others: did not attend  Mood/Affect: did not attend  Triggers (if applicable): did not attend  Cognition: did not attend  Progress: None  Comments: did not attend  Plan: continue with services

## 2025-06-29 NOTE — GROUP NOTE
Group Topic: Other   Group Date: 6/29/2025  Start Time: 1330  End Time: 1430  Facilitators: NESS BoltonS   Department: [facility] REHABTH VIRTUAL    Number of Participants: 12   Group Focus: concentration, impulsivity, leisure skills, and social skills, following directions  Treatment Modality: Leisure Development and Other: Recreation Therapy   Interventions utilized were group exercise, leisure development, and mental fitness  Purpose: In this recreational therapy session patients engaged in a group game of “Beckham Mentality” developing social interaction, increased focus and following directions. This activity aims to provide a positive and enjoyable experience for participants.           Name: Bradley Mcguire YOB: 2000   MR: [id number]      Facilitator: Recreational Therapist  Level of Participation: active  Quality of Participation: appropriate/pleasant, attentive, cooperative, and engaged  Interactions with others: appropriate  Mood/Affect: appropriate and brightens with interaction  Cognition: coherent/clear  Progress: Moderate  Comments: pt problem is depressed mood. Joins group late. Displays good attention to tasks with appropriate interactions.   Plan: continue with services

## 2025-06-30 LAB
GLUCOSE BLD MANUAL STRIP-MCNC: 113 MG/DL (ref 74–99)
GLUCOSE BLD MANUAL STRIP-MCNC: 87 MG/DL (ref 74–99)

## 2025-06-30 PROCEDURE — 2500000001 HC RX 250 WO HCPCS SELF ADMINISTERED DRUGS (ALT 637 FOR MEDICARE OP): Performed by: INTERNAL MEDICINE

## 2025-06-30 PROCEDURE — 2500000001 HC RX 250 WO HCPCS SELF ADMINISTERED DRUGS (ALT 637 FOR MEDICARE OP)

## 2025-06-30 PROCEDURE — 2500000002 HC RX 250 W HCPCS SELF ADMINISTERED DRUGS (ALT 637 FOR MEDICARE OP, ALT 636 FOR OP/ED): Performed by: INTERNAL MEDICINE

## 2025-06-30 PROCEDURE — 82947 ASSAY GLUCOSE BLOOD QUANT: CPT

## 2025-06-30 PROCEDURE — 1140000001 HC PRIVATE PSYCH ROOM DAILY

## 2025-06-30 PROCEDURE — 99233 SBSQ HOSP IP/OBS HIGH 50: CPT | Performed by: INTERNAL MEDICINE

## 2025-06-30 RX ADMIN — METFORMIN ER 500 MG 1000 MG: 500 TABLET ORAL at 17:03

## 2025-06-30 RX ADMIN — MIRTAZAPINE 15 MG: 15 TABLET, FILM COATED ORAL at 20:08

## 2025-06-30 RX ADMIN — SIMVASTATIN 20 MG: 20 TABLET, FILM COATED ORAL at 20:08

## 2025-06-30 RX ADMIN — LEVOTHYROXINE SODIUM 75 MCG: 0.07 TABLET ORAL at 06:04

## 2025-06-30 ASSESSMENT — PAIN - FUNCTIONAL ASSESSMENT
PAIN_FUNCTIONAL_ASSESSMENT: 0-10

## 2025-06-30 ASSESSMENT — PAIN SCALES - GENERAL
PAINLEVEL_OUTOF10: 0 - NO PAIN

## 2025-06-30 NOTE — GROUP NOTE
Group Topic: Other   Group Date: 6/30/2025  Start Time: 1330  End Time: 1430  Facilitators: NESS BoltonS   Department: [facility] REHABTH VIRTUAL    Number of Participants: 5   Group Focus: concentration, leisure skills, self-esteem, and social skills  Treatment Modality: Leisure Development and Other: Recreation Therapy  Interventions utilized were group exercise, leisure development, and mental fitness  Purpose: Patients engaged in group game of Catch Phrase. This game can help to improve frustration tolerance, concentration, improving social communication, and building rapport for a positive social interaction while encouraging flexibility in thinking.      Name: Bradley Mcguire YOB: 2000   MR: [id number]      Facilitator: Recreational Therapist  Level of Participation: active  Quality of Participation: appropriate/pleasant, attentive, cooperative, and engaged  Interactions with others: appropriate  Mood/Affect: appropriate, bright, and positive  Cognition: coherent/clear  Progress: Significant  Comments: pt problem is depressed mood. Joins group late after speaking with psychiatrist. Displays good attention to tasks. Appropriate engagement with peers. Bright affect.   Plan: continue with services

## 2025-06-30 NOTE — GROUP NOTE
Group Topic: Goals   Group Date: 6/29/2025  Start Time: 2000  End Time: 2015  Facilitators: Iman Alejandro   Department: [facility] Geriatric     Number of Participants: 12   Group Focus: check in  Treatment Modality: Other: reflection  Interventions utilized were reminiscence and story telling  Purpose: feelings and self-care    Name: Bradley Mcguire YOB: 2000   MR: [id number]      Facilitator: Mental Health [facility]NA  Level of Participation: minimal  Quality of Participation: appropriate/pleasant  Interactions with others: appropriate  Mood/Affect: appropriate  Triggers (if applicable): none  Cognition: coherent/clear  Progress: Minimal  Comments: patient problem Adjustment disorder  Plan: continue with services

## 2025-06-30 NOTE — CARE PLAN
The patient's goals for the shift include     The clinical goals for the shift include Attend groups    Problem: Pain - Adult  Goal: Verbalizes/displays adequate comfort level or baseline comfort level  Outcome: Progressing     Problem: Safety - Adult  Goal: Free from fall injury  Outcome: Progressing     Problem: Chronic Conditions and Co-morbidities  Goal: Patient's chronic conditions and co-morbidity symptoms are monitored and maintained or improved  Outcome: Progressing     Problem: Nutrition  Goal: Nutrient intake appropriate for maintaining nutritional needs  Outcome: Progressing     Problem: Potential for Harm to Self or Others  Goal: Participates in unit activities  Outcome: Progressing  Goal: Denies harm toward self or others  Outcome: Progressing  Goal: Free from restraint events  Outcome: Progressing     Problem: Alteration in Sleep  Goal: STG - Reports nightly sleep, duration, and quality  Outcome: Progressing     Problem: Self Care Deficit  Goal: STG - Patient completes hygiene  Outcome: Progressing  Goal: Increase group attendance  Outcome: Progressing  Goal: Accepts need for medications  Outcome: Progressing  Goal: STG - Goes to and eats meals independently in dining room 100% of time  Outcome: Progressing

## 2025-06-30 NOTE — GROUP NOTE
Group Topic: Stress Reduction/Relaxation   Group Date: 6/30/2025  Start Time: 1030  End Time: 1130  Facilitators: Megan RANKIN CTRS   Department: [facility] REHABTH VIRTUAL    Number of Participants: 9   Group Focus: mindfulness, relaxation, and self-awareness  Treatment Modality: Dialectical Behavioral Therapy, Skills Training, and Other: Recreation Therapy   Interventions utilized were exploration, group exercise, patient education, and support  Purpose: In this therapeutic group patients engaged in mindfulness and relaxation skills including chair yoga exercise/stretching which is utilized to assist in decreasing stress and improve overall mental health. Pt also participated in deep breathing techniques and guided imagery which aims to promote relaxation, self-awareness and helps to decrease stress and anxiety.    Name: Bradley Mcguire YOB: 2000   MR: [id number]      Facilitator: Recreational Therapist  Level of Participation: active  Quality of Participation: appropriate/pleasant, attentive, cooperative, and engaged  Interactions with others: appropriate  Mood/Affect: appropriate  Cognition: coherent/clear  Progress: Moderate  Comments: pt problem is depressed mood. Joins group late after speaking with Resident.  Appropriate and cooperative interactions. Participates in discussion. Observed participating in demonstrated relaxation techniques.  Displays good attention to tasks.   Plan: continue with services

## 2025-06-30 NOTE — GROUP NOTE
Group Topic: Goals   Group Date: 6/30/2025  Start Time: 0730  End Time: 0800  Facilitators: Efraín Calero   Department: [facility] Geriatric     Number of Participants: 9   Group Focus: goals  Treatment Modality: Patient-Centered Therapy  Interventions utilized were assignment  Purpose: self-care    Name: Bradley Mcguire YOB: 2000   MR: [id number]      Facilitator: Mental Health PCNA  Level of Participation: did not attend  Quality of Participation: did not attend  Interactions with others: did not attend  Mood/Affect: did not attend  Triggers (if applicable): did not attend  Cognition: did not attend  Progress: None  Comments: did not attend  Plan: continue with services

## 2025-06-30 NOTE — PROGRESS NOTES
SINAIW met with pt and assisted in coordinating outpatient follow up appointment for pt through  psychiatry. Appointment scheduled for 7/21/25 at 9:30am virtually. LSW also contacted pt's PCP and scheduled a follow up appointment with first available being 8/19/25 at 8:30am. Pt will discharge tomorrow. LSW offered transportation to pt but declined and reported that his wife would be able to pick him up when discharged.     Radha Hayes, CHRISTIAN HARRIS

## 2025-06-30 NOTE — PROGRESS NOTES
[facility]: [facility]  PROGRESS NOTE      Bradley Whitten Kalee Mcguire is a 25 y.o. male that is being seen  today for medical management at [facility]..  Subjective   Patient is being seen for follow-up by [facility].  Patient has been doing well.  Patient blood sugars have been fairly controlled blood pressure is fairly controlled lipid panel is stable.  Patient has been participating in activities.      ROS  Negative for fever or chills  Negative for sore throat, ear pain, nasal discharge  Negative for cough, shortness of breath or wheezing  Negative for chest pain, palpitations, swelling of legs  Negative for abdominal pain, constipation, diarrhea, blood in the stools  Negative for urinary complaints  Negative for headache, dizziness, weakness or numbness  Negative for joint pain  Positive for depression and anxiety  All other systems reviewed and were negative   Vitals:    06/30/25 0500   BP: 124/76   Pulse: 79   Resp: 16   Temp: 36.6 °C (97.9 °F)   SpO2: 98%      Vitals:    06/27/25 0945   Weight: 131 kg (288 lb 8 oz)     Body mass index is 39.13 kg/m².  Physical Exam  Constitutional: Patient does not appear to be in any acute distress  Head and Face: NCAT  Eyes: Normal external exam, EOMI  ENT: Normal external inspection of ears and nose. Oropharynx normal.  Cardiovascular: RRR, S1/S2, no murmurs, rubs, or gallops, radial pulses +2, no edema of extremities  Pulmonary: CTAB, no respiratory distress.  Abdomen: +BS, soft, non-tender, nondistended, no guarding or rebound, no masses noted  MSK: No joint swelling, normal movements of all extremities. Range of motion- normal.  Skin- No lesions, contusions, or erythema.  Peripheral puslses palpable bilaterally 2+  Neuro: AAO X3, Cranial nerves 2-12 grossly intact,DTR 2+ in all 4 limbs       LABS   [unfilled]  Lab Results   Component Value Date    GLUCOSE 107 (H) 06/25/2025    CALCIUM 10.1 06/25/2025     06/25/2025    K  4.0 06/25/2025    CO2 22 06/25/2025     06/25/2025    BUN 13 06/25/2025    CREATININE 0.90 06/25/2025     Lab Results   Component Value Date    ALT 53 (H) 06/25/2025    AST 25 06/25/2025    ALKPHOS 83 06/25/2025    BILITOT 0.5 06/25/2025     Lab Results   Component Value Date    WBC 8.4 06/25/2025    HGB 15.7 06/25/2025    HCT 44.9 06/25/2025    MCV 82 06/25/2025     06/25/2025     Lab Results   Component Value Date    CHOL 127 06/28/2025    CHOL 174 01/15/2024    CHOL 133 04/21/2023     Lab Results   Component Value Date    HDL 29.5 06/28/2025    HDL 31.0 01/15/2024    HDL 32.5 (A) 04/21/2023     Lab Results   Component Value Date    LDLCALC 79 06/28/2025    LDLCALC 73 01/15/2024     Lab Results   Component Value Date    TRIG 94 06/28/2025    TRIG 349 (H) 01/15/2024    TRIG 114 04/21/2023     Lab Results   Component Value Date    HGBA1C 5.9 (H) 05/21/2025     Other labs not included in the list above were reviewed either before or during this encounter.    History    Medical History[1]  Surgical History[2]  Family History[3]  Allergies[4]  Medications Ordered Prior to Encounter[5]  Immunization History   Administered Date(s) Administered    Influenza, Unspecified 10/25/2023    Moderna COVID-19 vaccine, 12 years and older (50mcg/0.5mL)(Spikevax) 11/07/2023     Patient's medical history was reviewed and updated either before or during this encounter.  ASSESSMENT / PLAN:  Active Hospital Problems    *Adjustment disorder with mixed anxiety and depressed mood      Dyslipidemia      LUIS ALBERTO (generalized anxiety disorder)      Class 3 severe obesity due to excess calories without serious comorbidity with body mass index (BMI) of 40.0 to 44.9 in adult      Acquired hypothyroidism      Pre-diabetes    Patient is being seen for follow-up at the [facility].  Patient's lab work reviewed and has been stable.  Patient is getting blood sugar checked and has been stable.  Patient is on metformin.  Thyroid functions have  been stable cholesterol levels are stable.  Patient has been participating in activities.        Jorge Rangel MD        [1] History reviewed. No pertinent past medical history.  [2] History reviewed. No pertinent surgical history.  [3] No family history on file.  [4] No Known Allergies  [5]   No current facility-administered medications on file prior to encounter.     Current Outpatient Medications on File Prior to Encounter   Medication Sig Dispense Refill    buPROPion (Wellbutrin) 75 mg tablet Take 1 tablet (75 mg) by mouth 2 times a day. (Patient taking differently: Take 1 tablet (75 mg) by mouth once daily.) 60 tablet 3    escitalopram (Lexapro) 20 mg tablet Take 1 tablet (20 mg) by mouth once daily.      levothyroxine (Synthroid, Levoxyl) 75 mcg tablet Take 1 tablet (75 mcg) by mouth once daily. 90 tablet 1    metFORMIN XR (Glucophage-XR) 500 mg 24 hr tablet Take 2 tablets (1,000 mg) by mouth once daily in the evening. Take with meals. 180 tablet 3    simvastatin (Zocor) 20 mg tablet Take 1 tablet (20 mg) by mouth once daily. 90 tablet 3    [DISCONTINUED] tirzepatide, weight loss, (Zepbound) 2.5 mg/0.5 mL injection Inject 2.5 mg under the skin every 7 days. 2 mL 0    [DISCONTINUED] tirzepatide, weight loss, (Zepbound) 5 mg/0.5 mL injection Inject 5 mg under the skin every 7 days. Do not fill before July 2, 2025. 2 mL 2

## 2025-06-30 NOTE — CARE PLAN
The patient's goals for the shift include attend groups    The clinical goals for the shift include maintain safety/rest      Problem: Safety - Adult  Goal: Free from fall injury  Outcome: Progressing     Problem: Discharge Planning  Goal: Discharge to home or other facility with appropriate resources  Outcome: Progressing     Problem: Chronic Conditions and Co-morbidities  Goal: Patient's chronic conditions and co-morbidity symptoms are monitored and maintained or improved  Outcome: Progressing     Problem: Nutrition  Goal: Nutrient intake appropriate for maintaining nutritional needs  Outcome: Progressing     Problem: Potential for Harm to Self or Others  Goal: Participates in unit activities  Outcome: Progressing     Problem: Potential for Harm to Self or Others  Goal: Patient/Family participate in treatment and discharge plans  Outcome: Progressing     Problem: Anxiety  Goal: Implements measures to reduce anxiety  Outcome: Progressing     Problem: Self Care Deficit  Goal: Increase group attendance  Outcome: Progressing     Problem: Self Care Deficit  Goal: Accepts need for medications  Outcome: Progressing

## 2025-06-30 NOTE — PROGRESS NOTES
"Bradley Carmona Fish Mcguire is a 25 y.o. male on day 3 of admission presenting with Adjustment disorder with mixed anxiety and depressed mood.      Subjective   Chart reviewed and case discussed with team.        Patient seen in a group room on approach, playing chess with another patient.  Patient agreeable to engage in conversation with this author.  When asked, patient reports he is doing \"much better over the weekend, it is funny like he realized when you are trapped in here\" he says in a seemingly lighthearted manner.  Patient describes feeling \"thankful for the time I cannot to reflect and realized that me starting my surgery is not worth ending my life.\"  He reports having had times during the past weekend where he was able to read a book and think about the severity of his thoughts prior to presentation and the need for current care and outpatient follow-up afterwards.  When asked, patient reports improvement in mood and sleep which he attributes to hospitalization and mirtazapine administration.  Patient plans to continue seeing therapist and psychiatrist on outpatient basis after discharge.  He also expresses \"I feel very fortunate because my wife is so supportive and is willing to visit me every day regarding the hospitalization.\"  Patient remains discharge focused but appears to appreciate severity of presentation at time of admission.  He denies thoughts of harming self or others.  Remains absent of psychotic and manic features.  Reports is sleeping better and eating well.  Has not needed as needed medications.  Agreeable with discharge tomorrow with collateral from family which is supportive of this plan.    Objective     Last Recorded Vitals  Blood pressure 124/76, pulse 79, temperature 36.6 °C (97.9 °F), temperature source Temporal, resp. rate 16, height 1.829 m (6'), weight 131 kg (288 lb 8 oz), SpO2 98%.    Sleep Log  Estimated \"Sleeping\" Durations   Night Est. Hours   06/26 " "0.00   06/27 11.00-11.75   06/28 9.75   06/29 8.75-9.25        Review of Systems    Psychiatric ROS - Adult  Anxiety: General Anxiety Disorder (LUIS ALBERTO)LUIS ALBERTO Behaviors: difficult to control worry, excessive anxiety/worry, difficulty concentrating, and sleep disturbance  Depression: concentration  Delirium: negative  Psychosis: negative  Maye: negative  Safety Issues: negative     Physical Exam      Mental Status Exam  General/Appearance: No Acute Psychological Distress, appears stated age, in hospital gown, sitting on bed, body habitus: overweight, tall, grooming/hygiene is good, combed hair, trimmed fingernails  Attitude/Behavior: engages in interview, cooperative, open, appropriate eye contact, calm  Motor Activity: no psychomotor agitation/retardation, gait: stable  Mood: \"Great\"  Affect: Quality-mood congruent, less anxious, Intensity-normal, Range-full  Speech: conversational rate, volume, prosody, syntax, tone  Thought Process: linear  Thought Content: denies SI/HI, ongoing minimization concern, is discharge focused/preoccupied.  Delusional thinking: none elicited  Thought Perception: denies AVH  Cognition: alert & oriented x 4, no deficits in attention/concentration noted, good fund of knowledge, recent and remote memory intact  Insight: evolving to fair, as evidenced by increased appreciation for the severity of the presentation, the recommendation by multiple physicians to proceed to this level of care, and the failure to spontaneously appreciate the value of a brief hospital stay to stabilize and coordinate a safe discharge plan  Judgment: Fair evidenced by cooperation on unit with assessments, agreeability to take medications by giving consent, making needs known, less than fair evidenced by discharge preoccupation and efforts to avoid treatment by being discharged to the community lacking as of yet a safe discharge plan.    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  Protective Factors against Suicide: adherence to  treatment, employment, marriage/partnership, positive family relationships, and sense of responsibility toward family  Acute Risk of Harm to Self is Considered: moderate    Relevant Results               Assessment & Plan  Adjustment disorder with mixed anxiety and depressed mood    Acquired hypothyroidism    Pre-diabetes    Class 3 severe obesity due to excess calories without serious comorbidity with body mass index (BMI) of 40.0 to 44.9 in adult    Dyslipidemia    LUIS ALBERTO (generalized anxiety disorder)    25-year-old male presenting meeting criteria for major depressive disorder with suicidality.  Patient admitted to the unit with discontinuation of Lexapro and Wellbutrin favoring Remeron instead for more favorable side effect profile.  Patient tolerating this medication change well showing improvements maintaining consolidated gains planning for discharge tomorrow.  Social work is coordinating home-going appointments.    Impression  # Unspecified mood disorder  - Rule out adjustment disorder versus major depressive disorder     Biological -      Planning for discharge tomorrow    We will continue Mirtazapine 15mg PO at bedtime, for mood and sleep as patient reports benefit       Medical to please co-manage pertinences.      Discussion with patient regarding risk benefits and alternatives and side effects with opportunity to ask questions and questions answered.  Patient given consent to the plan as noted     2. Psychological -        Patient is encouraged to participate with the therapeutic milieu and program and group therapy        3. Sociological -       Patient encouraged to cooperate with social work staff  on issues relevant to discharge planning                  Treatment, care, and plan discussed with Dr. Yates who agrees with stated above.     Samuel Allan MD  PGY2 Psychiatry

## 2025-07-01 ENCOUNTER — PHARMACY VISIT (OUTPATIENT)
Dept: PHARMACY | Facility: CLINIC | Age: 25
End: 2025-07-01
Payer: COMMERCIAL

## 2025-07-01 VITALS
RESPIRATION RATE: 17 BRPM | TEMPERATURE: 97.7 F | HEART RATE: 78 BPM | SYSTOLIC BLOOD PRESSURE: 131 MMHG | DIASTOLIC BLOOD PRESSURE: 71 MMHG | HEIGHT: 72 IN | BODY MASS INDEX: 39.08 KG/M2 | OXYGEN SATURATION: 98 % | WEIGHT: 288.5 LBS

## 2025-07-01 PROBLEM — F33.2 SEVERE RECURRENT MAJOR DEPRESSION WITHOUT PSYCHOTIC FEATURES (MULTI): Status: ACTIVE | Noted: 2025-07-01

## 2025-07-01 LAB — GLUCOSE BLD MANUAL STRIP-MCNC: 100 MG/DL (ref 74–99)

## 2025-07-01 PROCEDURE — 2500000002 HC RX 250 W HCPCS SELF ADMINISTERED DRUGS (ALT 637 FOR MEDICARE OP, ALT 636 FOR OP/ED): Performed by: INTERNAL MEDICINE

## 2025-07-01 PROCEDURE — RXMED WILLOW AMBULATORY MEDICATION CHARGE

## 2025-07-01 PROCEDURE — 82947 ASSAY GLUCOSE BLOOD QUANT: CPT

## 2025-07-01 RX ORDER — MIRTAZAPINE 15 MG/1
22.5 TABLET, FILM COATED ORAL NIGHTLY
Qty: 45 TABLET | Refills: 0 | Status: SHIPPED | OUTPATIENT
Start: 2025-07-01

## 2025-07-01 RX ORDER — METFORMIN HYDROCHLORIDE 500 MG/1
1000 TABLET, EXTENDED RELEASE ORAL
Qty: 60 TABLET | Refills: 0 | Status: SHIPPED | OUTPATIENT
Start: 2025-07-01

## 2025-07-01 RX ORDER — HYDROXYZINE HYDROCHLORIDE 25 MG/1
25 TABLET, FILM COATED ORAL EVERY 8 HOURS PRN
Qty: 10 TABLET | Refills: 0 | Status: SHIPPED | OUTPATIENT
Start: 2025-07-01

## 2025-07-01 RX ORDER — SIMVASTATIN 20 MG/1
20 TABLET, FILM COATED ORAL DAILY
Qty: 30 TABLET | Refills: 0 | Status: SHIPPED | OUTPATIENT
Start: 2025-07-01

## 2025-07-01 RX ORDER — LEVOTHYROXINE SODIUM 75 UG/1
75 TABLET ORAL DAILY
Qty: 30 TABLET | Refills: 0 | Status: SHIPPED | OUTPATIENT
Start: 2025-07-01

## 2025-07-01 RX ADMIN — LEVOTHYROXINE SODIUM 75 MCG: 0.07 TABLET ORAL at 06:09

## 2025-07-01 ASSESSMENT — PAIN SCALES - GENERAL: PAINLEVEL_OUTOF10: 0 - NO PAIN

## 2025-07-01 ASSESSMENT — PAIN - FUNCTIONAL ASSESSMENT: PAIN_FUNCTIONAL_ASSESSMENT: 0-10

## 2025-07-01 NOTE — NURSING NOTE
JOVANNA NOTE     Problem:  Depression     Behavior:  Pt out in group room this evening. Bright upon approach, behavior in control. Pleasant and interactive with peers. Complaint with meds, cooperative with care.   Group Participation: n/a  Appetite/Meals: HS snack provided     Interventions:  1:1 provided. Evening meds administered per orders. Encouraged pt to make needs known.    Response:  Pt calm, resting in bed at this time.     Plan:  Will continue to monitor behaviors and effectiveness of interventions while maintaining pt safety.

## 2025-07-01 NOTE — DISCHARGE SUMMARY
Admit Date: 6/27/2025   Discharge Date: 07/01/25     Reason For Admission:   Active Hospital Problems    *Severe recurrent major depression without psychotic features (Multi)      Dyslipidemia      LUIS ALBERTO (generalized anxiety disorder)      Class 3 severe obesity due to excess calories without serious comorbidity with body mass index (BMI) of 40.0 to 44.9 in adult      Acquired hypothyroidism      Pre-diabetes         Discharge Diagnosis  Severe recurrent major depression without psychotic features (Multi)    Issues Requiring Follow-Up  Link with psychiatry providers    Test Results Pending At Discharge  Pending Labs       No current pending labs.            Hospital Course   The reason for admission includes: Suicidal thoughts and depression worse.  Onset of symptoms was abrupt starting 2 weeks ago with rapidly worsening course since that time. Psychosocial Stressors: occupational.               History Of Present Illness  Bradleyjaun Mcguire is a 25 y.o. male with a history of anxiety and prediabetes presenting with suicidal ideations secondary to occupational demands.  Patient originally presented to [facility] emergency department on 6/25 for suicidal ideation after patient's wife contacted residency program coordinator to disclose patient had been having suicidal thoughts.  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  As such, patient became extremely upset and anxious and has been experiencing crying spells with uncontrolled worry and ruminations.  Patient reports that within recent years, while studying for step exams related to medical training, patient also had suicidal thoughts.  He was given psychiatric medications by his primary care provider and denies that he has been previously linked with nor has been seen by a psychiatrist.  He  "also reports that he has not previously been psychiatrically admitted.  He denies other suicidality beyond this and he denies suicide attempts in his history.     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  Notes he is set to begin radiology residency but underwent the match process which resulted in him learning he would have to complete a preliminary surgery year.  Patient discloses this caused a great deal of stress and anxiety which resulted in him reaching out to friends, family, and support system which includes family in [place].  Patient reports cycles in which he would become over whelmed with emotions and discussed accordingly with family and friends prior to resolution and undergo this cycle several times over the past 2 weeks until Wednesday in which patient began to develop suicidal ideations stating \"if I killed myself then I just went had to deal with this anymore.\"  Patient confides in his wife regularly and lists her as a major support during this time, she reached out to his residency program coordinator and disclosed suicidal ideation at which point patient was urged to seek care at the emergency department at [facility].  He discloses \"I did not know what else to do, I knew I had to do something.\"  Patient describes \"I cannot believe I got a surgery preliminary, I know my life is going to change and I am going to have to work 12 out of 13 days with only 1 day off.\"  He states the work our demand is going to be \"extremely difficult and pushed me to the edge.\"  Patient denies any access to firearms, current suicidal thoughts, or homicidal thoughts.  Patient also denies any hallucinations of any kind.  He denies having developed any plan to commit suicide initially when asked and then when asked again contributed an idea to find someone to inflict a " "fatal gun shot wound to him.     When asked, patient mentions \"I am currently feeling better, I feel like I am able to do okay today.\"  Discussed with patient risk factors regarding suicide and medical and surgical residents are higher risk than population and patient is likely at higher risk.  Patient is agreeable and expresses understanding.  Patient cites several stressors in life includingwork academic success, and lack of efficacy regarding current psychotropic medications after dosage decreases due to unwanted side effects.  Patient mentions he follows with PCP who prescribes Wellbutrin and Lexapro for anxiety and depression, although these medications were lowered for reasons stated above.  Patient informs this author of a strong desire to return home from the hospital setting due to \"I have a lot of things to do, I want to get out of here so I can take care of them.\"  Discussed with patient nature of psychiatric hospitalization.     ED Note  In brief Bradley Romedearelis Mathew Mcguire is an 25 y.o. male presenting for suicidal ideation.  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       Emergency Psychiatry Consultation Note  On chart review, Mr. Carmona presented to the  ED on 6/25 after being sent to the ED by his residency . 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 Per evaluation from [facility] in the ED, the patient began having suicidal thoughts due to being " "extremely upset and anxious, and has been \"increasingly distressed including crying spells, excessive/uncontrolled worry, and ruminations.\" Per previous documentation, the patient does not currently see a psychiatrist. He is currently being cared for by his PCP, Dr. Jyothi Mcintyre, and was previously on Lexapro 20 mg in 2024 before discontinuing the medication due to sexual side effects.      On chart review, Mr. Carmona presented to the  ED on 6/25 after being sent to the ED by his residency . 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 Per evaluation from [facility] in the ED, the patient began having suicidal thoughts due to being extremely upset and anxious, and has been \"increasingly distressed including crying spells, excessive/uncontrolled worry, and ruminations.\" Per previous documentation, the patient does not currently see a psychiatrist. He is currently being cared for by his PCP, Dr. Jyothi Mcintyre, and was previously on Lexapro 20 mg in 2024 before discontinuing the medication due to sexual side effects.      On interview, Mr. Carmona is cooperative and intermittently tearful while sitting in bed in the emergency department. 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 He states that over the past year, he applied for radiology residency positions in the [place] and matched into radiology here at . However, he states that he was upset to learn that rather than matching into an internal medicine transitional year that he had matched into a general surgery preliminary " "year that he would have to complete prior to beginning radiology training. Mr. Carmona states that he has recently discovered that this program is very intense, with residents working 12 straight days of 13+ hours shifts, and that he is very upset and anxious about not being able to make it through the program. He states that he is stressed about failing out of the program if he is not able to make it through, and reports that he would then be deported back to [place] without a visa, where his mother lives in a very poor financial situation. Due to these stressors, he states that he has begun having thoughts of suicide, stating that he has passive SI with no intent, but says he \"wishes someone would shoot me in the head.\" He expresses hopelessness, endorsing that he \"doesn't feel like there's a way out.\" He expressed these symptoms to his , who advised the patient to come to the ED for treatment. Mr. Carmona reports that his program has been \"very kind and supportive.\"     He states that he no previous history of SI or suicide attempts, and has never been psychiatrically hospitalized in the past. He reports that he was on Lexapro 20 mg for 7 months in 2024 while he took his STEP examinations for residency, and states that he did well on it but experienced sexual side effects. Thus, he discontinued this medication at that time. The patient reports that recently, he was started on Bupropion 150 mg by his PCP, but that he had a near-syncopal episode, so he cut his dose to 75 mg. Additionally, he has restarted his Lexapro, stating that he is taking 10 mg daily, but has only been doing this for the \"last few days.\" He denies homicidal or violent, and denies any auditory or visual hallucinations at this time. At this time, he states that he is amenable to inpatient psychiatric admission, and reports no other concerns or symptoms at this time.     6/28 & 29:  Pending Dr. Dinh weekend rounding " "notes    6/30:  Chart reviewed and case discussed with team.         Patient seen in a group room on approach, playing chess with another patient.  Patient agreeable to engage in conversation with this author.  When asked, patient reports he is doing \"much better over the weekend, it is funny like he realized when you are trapped in here\" he says in a seemingly lighthearted manner.  Patient describes feeling \"thankful for the time I cannot to reflect and realized that me starting my surgery is not worth ending my life.\"  He reports having had times during the past weekend where he was able to read a book and think about the severity of his thoughts prior to presentation and the need for current care and outpatient follow-up afterwards.  When asked, patient reports improvement in mood and sleep which he attributes to hospitalization and mirtazapine administration.  Patient plans to continue seeing therapist and psychiatrist on outpatient basis after discharge.  He also expresses \"I feel very fortunate because my wife is so supportive and is willing to visit me every day regarding the hospitalization.\"  Patient remains discharge focused but appears to appreciate severity of presentation at time of admission.  He denies thoughts of harming self or others.  Remains absent of psychotic and manic features.  Reports is sleeping better and eating well.  Has not needed as needed medications.  Agreeable with discharge tomorrow with collateral from family which is supportive of this plan.    7/1 - day of discharge:  Patient originally found participating in pharmacy group this morning.  Patient emerged willingly to meet with provider.  Patient and provider then ambulated to the unit courtyard for the discharge encounter.  Patient continued to participate in open discussion related to the admission circumstance and improvement since arrival.  He reports he slept well again last evening and has a strong appetite.  We reminded to " keep tabs on caloric intake related to side effects of Remeron and increasing appetite leading to weight gain in setting of already present metabolic disorder.  Patient reports feeling not depressed or anxious today when asked and does report some worry and concern for when eventually will have to start his training program in surgery.  Patient denies having suicidal thoughts and homicidal thoughts today when asked.  He has maintained full behavioral control and is not demonstrating any agitation or has he required PRNs to this extent.  He is participatory in the milieu making needs known cooperating with staff requests.  He has participated in allowing for collateral to coordinate a safe discharge and has scheduled follow-up appointments available to him in the community.  He reports feeling that the Remeron is a better choice of medication for him.  We discussed that the soporific effect will decrease as the dose increases and we discussed discharging on a higher dose as such to help with norepinephrine activation (15 more soporific --> 22.5 less soporific).  Patient remains concerned about work life balance and not being too tired during the day for medication side effects.  Patient is also furnished with a letter found in the communication section of his epic chart related to the admission and that pending [facility] paperwork completion will have a deferred start date for his training program.  Patient will get these forms to the provider as soon as possible at discharge for appropriate completion and return.  We again discussed with the patient his relative risk and personal risk related to suicide given his training program, being a physician, the rigorous demands that are upcoming, his personal history of depression anxiety and suicidality, and his need to be mindful of triggers and warning signs for decompensation.  Patient reports understanding.    Discharge Admission Goal Reconciliation    Admission goals met  during stay include reconciliation of medications, treatment of target symptoms, gathering of collateral supportive of discharge, and linking with appropriate level of care in the community.      At discharge, patient encouraged to participate in activity as tolerated, go to all follow up appointments, take all medications as directed, outreach social supports, and utilize crisis safety resources when appropriate.      Risk Assessment at Discharge  Psychiatric:  Patient's psychiatric condition is of lower risk than on admission given the accumulated and maintained gains as described herein.  Functioning now closer to if not at baseline, and patient is further able to identify appropriate and positive coping skills to manage further or recurrent symptoms.      Physical:  Patients physical condition at discharge is reasonable given ability to complete ADLs.      Social:  Social functioning is improved with patient identifying protective factors of family while also demonstrating increased social interactions on the unit and finally demonstrating appropriate problem solving skills for attending aftercare appointments.      Scheduled medications  hydrOXYzine HCL 25 mg tablet  Commonly known as: Atarax  Take 1 tablet (25 mg) by mouth every 8 hours if needed for anxiety.  1 tablet  levothyroxine 75 mcg tablet  Commonly known as: Synthroid, Levoxyl  Take 1 tablet (75 mcg) by mouth once daily.  Last time this was given: July 1, 2025  6:09 AM  1 tablet  metFORMIN  mg 24 hr tablet  Commonly known as: Glucophage-XR  Take 2 tablets (1,000 mg) by mouth once daily in the evening. Take with meals.  Last time this was given: June 30, 2025  5:03 PM  2 tablets  mirtazapine 15 mg tablet  Commonly known as: Remeron  Take 1.5 tablets (22.5 mg) by mouth once daily at bedtime.  Last time this was given: June 30, 2025  8:08 PM  1.5 tablets  simvastatin 20 mg tablet  Commonly known as: Zocor  Take 1 tablet (20 mg) by mouth once  "daily.  Last time this was given: June 30, 2025  8:08 PM  1 tablet        Pertinent Physical Exam At Time of Discharge  Physical Exam    Mental Status Exam  General/Appearance: No Acute Psychological Distress, appears stated age, in hospital gown, sitting in group room, body habitus: overweight, tall, grooming/hygiene is good, combed hair, trimmed fingernails, emerging facial hair  Attitude/Behavior: engages in interview, cooperative, open, appropriate eye contact, calm  Motor Activity: no psychomotor agitation/retardation, gait: stable  Mood: \"good\"  Affect: Quality-mood congruent, less anxious, Intensity-normal, Range-full  Speech: conversational rate, volume, prosody, syntax, tone; accented  Thought Process: linear, logical, organized, coherent  Thought Content: denies SI/HI, no evidenced delusional content  Thought Perception: denies AVH; no evidence of internal stimulation  Cognition: alert & oriented x 4, no deficits in attention/concentration noted, good fund of knowledge, recent and remote memory intact per recall of recent and remote events discussed  Insight: fair, as evidenced by increased appreciation for the severity of the presentation, the recommendation by multiple physicians to proceed to this level of care, and the recognized benefit of having been admitted for stabilization  Judgment: Fair evidenced by cooperation on unit with assessments, agreeability to take medications by giving consent, making needs known, participating to coordinate a safe discharge plan.        Outpatient Appointments/Follow-Ups  Future Appointments   Date Time Provider Department Center   7/21/2025  9:30 AM Maria Ines Varela MD [id number] Academic   8/19/2025  8:30 AM Jyothi Mcintyre MD [id number] None   1/2/2026 11:30 AM Feliciano Velasquez MD [id number] [facility]     Counseling/Therapy Services  [facility]  Schedule an appointment as soon as possible for a visit in 1 week(s)  Follow up with established Therapist as soon as " possible.    Medication Management  Maria Ines Varela MD  [id number]   [address]   [facility] [address] [phone]  Go on 7/21/2025  Establish services with provider at 9:30am virtually, for ongoing medication management of mental health needs.    Jyothi Mcintyre MD  [id number]  Department of Family Medicine  [facility] [address]  [phone]    Go on 8/19/2025  Follow up with established PCP at 8:30am in person for ongoing assistance with physical and medical needs.        Parts of this chart have been completed using voice recognition software.  Please excuse any errors of transcription.  Despite the medical decision making time stamp, my medical decision making has taken place during the patient's entire visit.  Thought process and reason for plan has been formulated from the time that I saw the patient until the time of disposition and is not specific to one specific moment during their visit and furthermore the medical decision making encompasses the entire chart and not only that represented in this note.       I personally spent 35 minutes today providing care for this patient, including preparation, face to face time, documentation and other services such as review of medical records, diagnostic result, patient education, counseling, coordination of care as specified in the encounter.      Johny Yates, DO

## 2025-07-01 NOTE — GROUP NOTE
Group Topic: Goals   Group Date: 6/30/2025  Start Time: 2001  End Time: 2015  Facilitators: Bere Lane   Department: [facility] Geriatric     Number of Participants: 8   Group Focus: goals  Treatment Modality: Other: PCA  Interventions utilized were reminiscence  Purpose: feelings and communication skills    Name: Bradley Mcguire YOB: 2000   MR: [id number]      Facilitator: [facility]  Level of Participation: active  Quality of Participation: appropriate/pleasant, attentive, and cooperative  Interactions with others: appropriate, gave feedback, and supportive  Mood/Affect: positive  Triggers (if applicable): N/A  Cognition: coherent/clear  Progress: Moderate  Comments: Pt problem is depression.   Plan: continue with services

## 2025-07-01 NOTE — GROUP NOTE
Group Topic: Goals   Group Date: 7/1/2025  Start Time: 0730  End Time: 0800  Facilitators: Rosemary Corona   Department: [facility] Geriatric     Number of Participants: 13   Group Focus: daily focus and other goals  Treatment Modality: Other: Goal setting activity  Interventions utilized were assignment  Purpose: other: Daily goal setting     Name: Bradley Mcguire YOB: 2000   MR: [id number]      Facilitator: [facility]  Level of Participation: minimal  Quality of Participation: appropriate/pleasant and attentive  Interactions with others: appropriate  Mood/Affect: appropriate  Cognition: coherent/clear  Progress: Minimal  Comments: Patient problem is mood disorder. Patient was present in goals group but did not fill out goal sheet.  Plan: continue with services

## 2025-07-01 NOTE — NURSING NOTE
"Discharge Nursing Note    Discharge date: 07/01/2025  Discharge time: 1355    Discharged to:  home    Transportation provided by:  Patient's wife present to transport patient home.     Responsible person notified of discharge/transfer?  Include name of person if answering \"YES\"  No; patient declined.    Patient left with all belongings:  Yes    Patient left with discharge medication list:  Yes    Patient left with discharge instructions:  Yes    AVS/Continuing Care Plan discussed with patient and copy given to either patient or handed to medical transport for discharges to facilities:  Yes    Other concerns at discharge:  None    Presentation on discharge:  Patient voices understanding of discharge medications/ instructions. Denies SI.   "

## 2025-07-01 NOTE — PROGRESS NOTES
Spiritual Care Visit  Spiritual Care Request    Reason for Visit:  Routine Visit: Introduction     Request Received From:       Focus of Care:  Visited With: Patient         Refer to :          Spiritual Care Assessment    Spiritual Assessment:                      Care Provided:       Sense of Community and or Mormonism Affiliation:  Yazdanism   Values/Beliefs  Spiritual Requests During Hospitalization: Bradley had qesttions about being  in the [employer].     Addressed Needs/Concerns and/or [place] Through:          Outcome:        Advance Directives:         Spiritual Care Annotation    Annotation:  Bradley had questions about being  in the [employer].  Bill Pickett

## 2025-07-01 NOTE — CARE PLAN
The clinical goals for the shift include maintain safety, promote rest.    Over the shift, the patient did make progress toward the following goals.       Problem: Pain - Adult  Goal: Verbalizes/displays adequate comfort level or baseline comfort level  Outcome: Progressing     Problem: Self Care Deficit  Goal: Accepts need for medications  Outcome: Progressing

## 2025-07-01 NOTE — CARE PLAN
The patient's goals for the shift include discharge today    The clinical goals for the shift include ensure pt is discharge ready    Over the shift, the patient did not make progress toward the following goals. Barriers to progression include none. Recommendations to address these barriers include none.

## 2025-07-01 NOTE — GROUP NOTE
Group Topic: Safety   Group Date: 7/1/2025  Start Time: 1100  End Time: 1145  Facilitators: NESS BoltonS   Department: [facility]    Number of Participants: 11   Group Focus: personal responsibility, safety plan, and self-awareness  Treatment Modality: Patient-Centered Therapy and Other: Recreation Therapy  Interventions utilized were assignment, exploration, group exercise, patient education, and support  Purpose: Patients engaged in group session focused on creating person centered safety plans. Patients were prompted to write a list of coping strategies and sources of support. Patients can use these strategies before or during a crisis.   Name: Bradley Mcguire YOB: 2000   MR: [id number]      Facilitator: Recreational Therapist  Level of Participation: active  Quality of Participation: appropriate/pleasant, attentive, cooperative, and engaged  Interactions with others: appropriate  Mood/Affect: appropriate and positive  Cognition: coherent/clear  Progress: Significant  Comments: pt problem is depressed mood. Able to follow along and complete safety plan appropriately. Leaves to speak with provider and spiritual care. Returns to group.   Plan: continue with services

## 2025-07-07 ENCOUNTER — PATIENT OUTREACH (OUTPATIENT)
Dept: CARE COORDINATION | Facility: CLINIC | Age: 25
End: 2025-07-07
Payer: COMMERCIAL

## 2025-07-07 NOTE — PROGRESS NOTES
Pt. Identified for post discharge services. Initial outreach call to introduce self, available services, and assess needs.  Voicemail message left with my contact information.   NIK Ramirez  [facility]    Contact: [phone]

## 2025-07-21 ENCOUNTER — PHARMACY VISIT (OUTPATIENT)
Dept: PHARMACY | Facility: CLINIC | Age: 25
End: 2025-07-21
Payer: COMMERCIAL

## 2025-07-21 ENCOUNTER — APPOINTMENT (OUTPATIENT)
Dept: BEHAVIORAL HEALTH | Facility: CLINIC | Age: 25
End: 2025-07-21
Payer: COMMERCIAL

## 2025-07-21 DIAGNOSIS — F41.1 GAD (GENERALIZED ANXIETY DISORDER): ICD-10-CM

## 2025-07-21 DIAGNOSIS — F33.2 SEVERE RECURRENT MAJOR DEPRESSION WITHOUT PSYCHOTIC FEATURES (MULTI): ICD-10-CM

## 2025-07-21 PROCEDURE — 99213 OFFICE O/P EST LOW 20 MIN: CPT | Performed by: PSYCHIATRY & NEUROLOGY

## 2025-07-21 PROCEDURE — RXMED WILLOW AMBULATORY MEDICATION CHARGE

## 2025-07-21 RX ORDER — ARIPIPRAZOLE 5 MG/1
5 TABLET ORAL DAILY
Qty: 30 TABLET | Refills: 3 | Status: SHIPPED | OUTPATIENT
Start: 2025-07-21

## 2025-07-21 RX ORDER — CLONAZEPAM 0.5 MG/1
0.5 TABLET ORAL 2 TIMES DAILY PRN
Qty: 45 TABLET | Refills: 0 | Status: SHIPPED | OUTPATIENT
Start: 2025-07-21

## 2025-07-21 RX ORDER — DESVENLAFAXINE 100 MG/1
100 TABLET, EXTENDED RELEASE ORAL DAILY
Qty: 30 TABLET | Refills: 11 | Status: SHIPPED | OUTPATIENT
Start: 2025-07-21 | End: 2026-07-21

## 2025-07-21 RX ORDER — HYDROXYZINE PAMOATE 25 MG/1
25 CAPSULE ORAL 3 TIMES DAILY PRN
Qty: 90 CAPSULE | Refills: 3 | Status: SHIPPED | OUTPATIENT
Start: 2025-07-21

## 2025-07-21 RX ORDER — VENLAFAXINE 100 MG/1
100 TABLET ORAL 2 TIMES DAILY
Qty: 60 TABLET | Refills: 3 | Status: SHIPPED | OUTPATIENT
Start: 2025-07-21

## 2025-07-23 PROCEDURE — RXMED WILLOW AMBULATORY MEDICATION CHARGE

## 2025-07-24 ENCOUNTER — PATIENT OUTREACH (OUTPATIENT)
Dept: CARE COORDINATION | Facility: CLINIC | Age: 25
End: 2025-07-24
Payer: COMMERCIAL

## 2025-07-24 NOTE — PROGRESS NOTES
Pt. Identified for post discharge services. Final outreach call to introduce self, available services, and assess needs.  Voicemail message left with my contact information.   NIK Ramirez  [facility] [address]    Contact: [phone]

## 2025-07-25 ENCOUNTER — PHARMACY VISIT (OUTPATIENT)
Dept: PHARMACY | Facility: CLINIC | Age: 25
End: 2025-07-25
Payer: COMMERCIAL

## 2025-08-11 ENCOUNTER — TELEMEDICINE (OUTPATIENT)
Dept: BEHAVIORAL HEALTH | Facility: HOSPITAL | Age: 25
End: 2025-08-11
Payer: COMMERCIAL

## 2025-08-18 PROCEDURE — RXMED WILLOW AMBULATORY MEDICATION CHARGE

## 2025-08-19 ENCOUNTER — PHARMACY VISIT (OUTPATIENT)
Dept: PHARMACY | Facility: CLINIC | Age: 25
End: 2025-08-19
Payer: COMMERCIAL

## 2025-08-19 ENCOUNTER — APPOINTMENT (OUTPATIENT)
Facility: HOSPITAL | Age: 25
End: 2025-08-19
Payer: COMMERCIAL

## 2025-08-26 ENCOUNTER — APPOINTMENT (OUTPATIENT)
Dept: BEHAVIORAL HEALTH | Facility: CLINIC | Age: 25
End: 2025-08-26
Payer: COMMERCIAL

## 2025-09-02 ENCOUNTER — APPOINTMENT (OUTPATIENT)
Dept: BEHAVIORAL HEALTH | Facility: CLINIC | Age: 25
End: 2025-09-02
Payer: COMMERCIAL

## 2025-09-09 ENCOUNTER — APPOINTMENT (OUTPATIENT)
Dept: BEHAVIORAL HEALTH | Facility: CLINIC | Age: 25
End: 2025-09-09
Payer: COMMERCIAL

## 2025-10-17 ENCOUNTER — APPOINTMENT (OUTPATIENT)
Facility: HOSPITAL | Age: 25
End: 2025-10-17
Payer: COMMERCIAL